# Patient Record
Sex: MALE | Race: ASIAN | NOT HISPANIC OR LATINO | URBAN - METROPOLITAN AREA
[De-identification: names, ages, dates, MRNs, and addresses within clinical notes are randomized per-mention and may not be internally consistent; named-entity substitution may affect disease eponyms.]

---

## 2018-07-02 ENCOUNTER — EMERGENCY (EMERGENCY)
Facility: HOSPITAL | Age: 53
LOS: 0 days | Discharge: HOME | End: 2018-07-02
Attending: STUDENT IN AN ORGANIZED HEALTH CARE EDUCATION/TRAINING PROGRAM
Payer: COMMERCIAL

## 2018-07-02 VITALS
DIASTOLIC BLOOD PRESSURE: 86 MMHG | HEIGHT: 74 IN | OXYGEN SATURATION: 97 % | HEART RATE: 68 BPM | WEIGHT: 199.96 LBS | TEMPERATURE: 99 F | SYSTOLIC BLOOD PRESSURE: 140 MMHG | RESPIRATION RATE: 18 BRPM

## 2018-07-02 DIAGNOSIS — T14.8XXA OTHER INJURY OF UNSPECIFIED BODY REGION, INITIAL ENCOUNTER: ICD-10-CM
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
## 2018-07-02 DIAGNOSIS — Z79.4 LONG TERM (CURRENT) USE OF INSULIN: ICD-10-CM

## 2018-07-02 DIAGNOSIS — M54.9 DORSALGIA, UNSPECIFIED: ICD-10-CM

## 2018-07-02 DIAGNOSIS — Q05.7 LUMBAR SPINA BIFIDA WITHOUT HYDROCEPHALUS: ICD-10-CM

## 2018-07-02 PROCEDURE — 72148 MRI LUMBAR SPINE W/O DYE: CPT | Mod: 26

## 2018-07-02 PROCEDURE — 99284 EMERGENCY DEPT VISIT MOD MDM: CPT

## 2018-07-02 RX ORDER — METHOCARBAMOL 500 MG/1
2 TABLET, FILM COATED ORAL
Qty: 60 | Refills: 0
Start: 2018-07-02 | End: 2018-07-11

## 2018-07-02 RX ORDER — METHOCARBAMOL 500 MG/1
1000 TABLET, FILM COATED ORAL ONCE
Qty: 0 | Refills: 0 | Status: COMPLETED | OUTPATIENT
Start: 2018-07-02 | End: 2018-07-02

## 2018-07-02 RX ORDER — KETOROLAC TROMETHAMINE 30 MG/ML
60 SYRINGE (ML) INJECTION ONCE
Qty: 0 | Refills: 0 | Status: DISCONTINUED | OUTPATIENT
Start: 2018-07-02 | End: 2018-07-02

## 2018-07-02 RX ADMIN — METHOCARBAMOL 1000 MILLIGRAM(S): 500 TABLET, FILM COATED ORAL at 17:05

## 2018-07-02 RX ADMIN — Medication 60 MILLIGRAM(S): at 17:04

## 2018-07-02 NOTE — ED ADULT NURSE NOTE - OBJECTIVE STATEMENT
Patient stated he was coming down the stairs and he tripped, reports pain of 8 on a scale of 0 to 10, pain in lower back, denies syncope, denies head trauma

## 2018-07-02 NOTE — ED PROVIDER NOTE - OBJECTIVE STATEMENT
52 year old male presents today sent in by his PMD for back pain, pt states that he fell down on his back down 6-7 carpeted steps, pt c/o right sided pain rated 8/10 (-) radiation (-) paresthesias (-) urinary or bowel incontinence (-) hematuria,  +tenderness with movement, pt was sent in for MRI

## 2019-12-28 ENCOUNTER — EMERGENCY (EMERGENCY)
Facility: HOSPITAL | Age: 54
LOS: 1 days | Discharge: HOME | End: 2019-12-28
Attending: EMERGENCY MEDICINE | Admitting: EMERGENCY MEDICINE
Payer: COMMERCIAL

## 2019-12-28 VITALS
HEART RATE: 83 BPM | SYSTOLIC BLOOD PRESSURE: 134 MMHG | RESPIRATION RATE: 16 BRPM | TEMPERATURE: 99 F | OXYGEN SATURATION: 100 % | DIASTOLIC BLOOD PRESSURE: 82 MMHG

## 2019-12-28 LAB
ALBUMIN SERPL ELPH-MCNC: 4.5 G/DL — SIGNIFICANT CHANGE UP (ref 3.3–5)
ALP SERPL-CCNC: 49 U/L — SIGNIFICANT CHANGE UP (ref 40–120)
ALT FLD-CCNC: 27 U/L — SIGNIFICANT CHANGE UP (ref 4–41)
ANION GAP SERPL CALC-SCNC: 15 MMO/L — HIGH (ref 7–14)
APPEARANCE UR: CLEAR — SIGNIFICANT CHANGE UP
APTT BLD: 33.5 SEC — SIGNIFICANT CHANGE UP (ref 27.5–36.3)
AST SERPL-CCNC: 32 U/L — SIGNIFICANT CHANGE UP (ref 4–40)
BASOPHILS # BLD AUTO: 0.03 K/UL — SIGNIFICANT CHANGE UP (ref 0–0.2)
BASOPHILS NFR BLD AUTO: 0.5 % — SIGNIFICANT CHANGE UP (ref 0–2)
BILIRUB SERPL-MCNC: 0.5 MG/DL — SIGNIFICANT CHANGE UP (ref 0.2–1.2)
BILIRUB UR-MCNC: NEGATIVE — SIGNIFICANT CHANGE UP
BLD GP AB SCN SERPL QL: NEGATIVE — SIGNIFICANT CHANGE UP
BLOOD UR QL VISUAL: NEGATIVE — SIGNIFICANT CHANGE UP
BUN SERPL-MCNC: 15 MG/DL — SIGNIFICANT CHANGE UP (ref 7–23)
CALCIUM SERPL-MCNC: 9.8 MG/DL — SIGNIFICANT CHANGE UP (ref 8.4–10.5)
CHLORIDE SERPL-SCNC: 96 MMOL/L — LOW (ref 98–107)
CO2 SERPL-SCNC: 24 MMOL/L — SIGNIFICANT CHANGE UP (ref 22–31)
COLOR SPEC: SIGNIFICANT CHANGE UP
CREAT SERPL-MCNC: 0.8 MG/DL — SIGNIFICANT CHANGE UP (ref 0.5–1.3)
EOSINOPHIL # BLD AUTO: 0.21 K/UL — SIGNIFICANT CHANGE UP (ref 0–0.5)
EOSINOPHIL NFR BLD AUTO: 3.8 % — SIGNIFICANT CHANGE UP (ref 0–6)
GLUCOSE SERPL-MCNC: 261 MG/DL — HIGH (ref 70–99)
GLUCOSE UR-MCNC: >1000 — HIGH
HCT VFR BLD CALC: 52.3 % — HIGH (ref 39–50)
HGB BLD-MCNC: 16.9 G/DL — SIGNIFICANT CHANGE UP (ref 13–17)
IMM GRANULOCYTES NFR BLD AUTO: 0.4 % — SIGNIFICANT CHANGE UP (ref 0–1.5)
INR BLD: 1 — SIGNIFICANT CHANGE UP (ref 0.88–1.17)
KETONES UR-MCNC: NEGATIVE — SIGNIFICANT CHANGE UP
LEUKOCYTE ESTERASE UR-ACNC: NEGATIVE — SIGNIFICANT CHANGE UP
LIDOCAIN IGE QN: 244.5 U/L — HIGH (ref 7–60)
LYMPHOCYTES # BLD AUTO: 1.68 K/UL — SIGNIFICANT CHANGE UP (ref 1–3.3)
LYMPHOCYTES # BLD AUTO: 30.4 % — SIGNIFICANT CHANGE UP (ref 13–44)
MCHC RBC-ENTMCNC: 27.3 PG — SIGNIFICANT CHANGE UP (ref 27–34)
MCHC RBC-ENTMCNC: 32.3 % — SIGNIFICANT CHANGE UP (ref 32–36)
MCV RBC AUTO: 84.5 FL — SIGNIFICANT CHANGE UP (ref 80–100)
MONOCYTES # BLD AUTO: 0.58 K/UL — SIGNIFICANT CHANGE UP (ref 0–0.9)
MONOCYTES NFR BLD AUTO: 10.5 % — SIGNIFICANT CHANGE UP (ref 2–14)
NEUTROPHILS # BLD AUTO: 3 K/UL — SIGNIFICANT CHANGE UP (ref 1.8–7.4)
NEUTROPHILS NFR BLD AUTO: 54.4 % — SIGNIFICANT CHANGE UP (ref 43–77)
NITRITE UR-MCNC: NEGATIVE — SIGNIFICANT CHANGE UP
NRBC # FLD: 0 K/UL — SIGNIFICANT CHANGE UP (ref 0–0)
PH UR: 6.5 — SIGNIFICANT CHANGE UP (ref 5–8)
PLATELET # BLD AUTO: 197 K/UL — SIGNIFICANT CHANGE UP (ref 150–400)
PMV BLD: 11.8 FL — SIGNIFICANT CHANGE UP (ref 7–13)
POTASSIUM SERPL-MCNC: 4.7 MMOL/L — SIGNIFICANT CHANGE UP (ref 3.5–5.3)
POTASSIUM SERPL-SCNC: 4.7 MMOL/L — SIGNIFICANT CHANGE UP (ref 3.5–5.3)
PROT SERPL-MCNC: 8.1 G/DL — SIGNIFICANT CHANGE UP (ref 6–8.3)
PROT UR-MCNC: NEGATIVE — SIGNIFICANT CHANGE UP
PROTHROM AB SERPL-ACNC: 11.4 SEC — SIGNIFICANT CHANGE UP (ref 9.8–13.1)
RBC # BLD: 6.19 M/UL — HIGH (ref 4.2–5.8)
RBC # FLD: 13.2 % — SIGNIFICANT CHANGE UP (ref 10.3–14.5)
RH IG SCN BLD-IMP: POSITIVE — SIGNIFICANT CHANGE UP
SODIUM SERPL-SCNC: 135 MMOL/L — SIGNIFICANT CHANGE UP (ref 135–145)
SP GR SPEC: 1.03 — SIGNIFICANT CHANGE UP (ref 1–1.04)
UROBILINOGEN FLD QL: NORMAL — SIGNIFICANT CHANGE UP
WBC # BLD: 5.52 K/UL — SIGNIFICANT CHANGE UP (ref 3.8–10.5)
WBC # FLD AUTO: 5.52 K/UL — SIGNIFICANT CHANGE UP (ref 3.8–10.5)

## 2019-12-28 PROCEDURE — 99285 EMERGENCY DEPT VISIT HI MDM: CPT

## 2019-12-28 PROCEDURE — 74177 CT ABD & PELVIS W/CONTRAST: CPT | Mod: 26

## 2019-12-28 RX ORDER — SODIUM CHLORIDE 9 MG/ML
1000 INJECTION INTRAMUSCULAR; INTRAVENOUS; SUBCUTANEOUS ONCE
Refills: 0 | Status: COMPLETED | OUTPATIENT
Start: 2019-12-28 | End: 2019-12-28

## 2019-12-28 RX ORDER — MORPHINE SULFATE 50 MG/1
4 CAPSULE, EXTENDED RELEASE ORAL ONCE
Refills: 0 | Status: DISCONTINUED | OUTPATIENT
Start: 2019-12-28 | End: 2019-12-28

## 2019-12-28 RX ADMIN — MORPHINE SULFATE 4 MILLIGRAM(S): 50 CAPSULE, EXTENDED RELEASE ORAL at 15:06

## 2019-12-28 RX ADMIN — SODIUM CHLORIDE 1000 MILLILITER(S): 9 INJECTION INTRAMUSCULAR; INTRAVENOUS; SUBCUTANEOUS at 15:06

## 2019-12-28 NOTE — ED PROVIDER NOTE - NSFOLLOWUPINSTRUCTIONS_ED_ALL_ED_FT
Follow up with your primary care physician in the next week  Return to ED for worsening pain, excessive vomiting, or any other complaints in which you feel you require immediate care  Take motrin 600mg every 6 hours for pain

## 2019-12-28 NOTE — ED ADULT NURSE REASSESSMENT NOTE - NS ED NURSE REASSESS COMMENT FT1
Intake pt coming with RLQ pain x few days, denies dysuria, Hematuria, no N/V/D pt tender on palp.    IV to right lat. AC 20g angio cath. medicated for pain.   pending US./UA    Mariia Hood RN

## 2019-12-28 NOTE — ED ADULT NURSE REASSESSMENT NOTE - NS ED NURSE REASSESS COMMENT FT1
Intake pt coming with some SOB, shortness of breath since yest. with some pressure under ribs area, pt recent Dx. with Cervical Ca, right chest area Med port place recently for future Chemo Tx.    Pt AOX 3   denies CP, dizziness, no N/V.    IV to right AC 20g angio cath place, labs sent.   Pending dispo.       Mariia Hood RN

## 2019-12-28 NOTE — ED PROVIDER NOTE - OBJECTIVE STATEMENT
55 yo M with HTN. HLP, DM here with RLQ abdominal pain with radiation to the right flank x 3 days that has been worsening. The pt saw his pmd who sent him to the ED to r/o appendicitis. Pt says he had subjective fever for the past two days. Denies n/v, dysuria, testicular pain, diarrhea, constipation.

## 2019-12-28 NOTE — ED PROVIDER NOTE - PROGRESS NOTE DETAILS
Maurilio: pt in no pain; curious why he had pain; explained I cant give him a definitive answer ?passed stone; assured him CT shows no pathology

## 2019-12-28 NOTE — ED PROVIDER NOTE - CLINICAL SUMMARY MEDICAL DECISION MAKING FREE TEXT BOX
r/o appendicitis vs. ureteral stone, however, history and physical most consistent with appendicitis; will check labs, ua, uc/s, CT abdomen/pelvis with IV contrast, pain control, fluids, reassess

## 2019-12-30 LAB
BACTERIA UR CULT: SIGNIFICANT CHANGE UP
SPECIMEN SOURCE: SIGNIFICANT CHANGE UP

## 2021-11-26 ENCOUNTER — INPATIENT (INPATIENT)
Facility: HOSPITAL | Age: 56
LOS: 0 days | Discharge: HOME | End: 2021-11-27
Attending: INTERNAL MEDICINE | Admitting: INTERNAL MEDICINE
Payer: COMMERCIAL

## 2021-11-26 VITALS
RESPIRATION RATE: 18 BRPM | HEART RATE: 83 BPM | HEIGHT: 74 IN | DIASTOLIC BLOOD PRESSURE: 101 MMHG | OXYGEN SATURATION: 99 % | SYSTOLIC BLOOD PRESSURE: 179 MMHG | TEMPERATURE: 98 F

## 2021-11-26 DIAGNOSIS — I21.4 NON-ST ELEVATION (NSTEMI) MYOCARDIAL INFARCTION: ICD-10-CM

## 2021-11-26 LAB
ALBUMIN SERPL ELPH-MCNC: 4.6 G/DL — SIGNIFICANT CHANGE UP (ref 3.3–5)
ALP SERPL-CCNC: 48 U/L — SIGNIFICANT CHANGE UP (ref 40–120)
ALT FLD-CCNC: 25 U/L — SIGNIFICANT CHANGE UP (ref 4–41)
ANION GAP SERPL CALC-SCNC: 14 MMOL/L — SIGNIFICANT CHANGE UP (ref 7–14)
APTT BLD: 34.6 SEC — SIGNIFICANT CHANGE UP (ref 27–36.3)
AST SERPL-CCNC: 30 U/L — SIGNIFICANT CHANGE UP (ref 4–40)
BASOPHILS # BLD AUTO: 0.03 K/UL — SIGNIFICANT CHANGE UP (ref 0–0.2)
BASOPHILS NFR BLD AUTO: 0.5 % — SIGNIFICANT CHANGE UP (ref 0–2)
BILIRUB SERPL-MCNC: 0.4 MG/DL — SIGNIFICANT CHANGE UP (ref 0.2–1.2)
BUN SERPL-MCNC: 15 MG/DL — SIGNIFICANT CHANGE UP (ref 7–23)
CALCIUM SERPL-MCNC: 9.2 MG/DL — SIGNIFICANT CHANGE UP (ref 8.4–10.5)
CHLORIDE SERPL-SCNC: 101 MMOL/L — SIGNIFICANT CHANGE UP (ref 98–107)
CK MB CFR SERPL CALC: 19.8 NG/ML — HIGH
CO2 SERPL-SCNC: 22 MMOL/L — SIGNIFICANT CHANGE UP (ref 22–31)
CREAT SERPL-MCNC: 0.78 MG/DL — SIGNIFICANT CHANGE UP (ref 0.5–1.3)
EOSINOPHIL # BLD AUTO: 0.14 K/UL — SIGNIFICANT CHANGE UP (ref 0–0.5)
EOSINOPHIL NFR BLD AUTO: 2.1 % — SIGNIFICANT CHANGE UP (ref 0–6)
GLUCOSE SERPL-MCNC: 203 MG/DL — HIGH (ref 70–99)
HCT VFR BLD CALC: 46.9 % — SIGNIFICANT CHANGE UP (ref 39–50)
HGB BLD-MCNC: 15.5 G/DL — SIGNIFICANT CHANGE UP (ref 13–17)
IANC: 4.58 K/UL — SIGNIFICANT CHANGE UP (ref 1.5–8.5)
IMM GRANULOCYTES NFR BLD AUTO: 0.6 % — SIGNIFICANT CHANGE UP (ref 0–1.5)
INR BLD: 1.02 RATIO — SIGNIFICANT CHANGE UP (ref 0.88–1.16)
LYMPHOCYTES # BLD AUTO: 1.34 K/UL — SIGNIFICANT CHANGE UP (ref 1–3.3)
LYMPHOCYTES # BLD AUTO: 20.3 % — SIGNIFICANT CHANGE UP (ref 13–44)
MCHC RBC-ENTMCNC: 27.1 PG — SIGNIFICANT CHANGE UP (ref 27–34)
MCHC RBC-ENTMCNC: 33 GM/DL — SIGNIFICANT CHANGE UP (ref 32–36)
MCV RBC AUTO: 82.1 FL — SIGNIFICANT CHANGE UP (ref 80–100)
MONOCYTES # BLD AUTO: 0.46 K/UL — SIGNIFICANT CHANGE UP (ref 0–0.9)
MONOCYTES NFR BLD AUTO: 7 % — SIGNIFICANT CHANGE UP (ref 2–14)
NEUTROPHILS # BLD AUTO: 4.58 K/UL — SIGNIFICANT CHANGE UP (ref 1.8–7.4)
NEUTROPHILS NFR BLD AUTO: 69.5 % — SIGNIFICANT CHANGE UP (ref 43–77)
NRBC # BLD: 0 /100 WBCS — SIGNIFICANT CHANGE UP
NRBC # FLD: 0 K/UL — SIGNIFICANT CHANGE UP
NT-PROBNP SERPL-SCNC: 166 PG/ML — SIGNIFICANT CHANGE UP
PLATELET # BLD AUTO: 196 K/UL — SIGNIFICANT CHANGE UP (ref 150–400)
POTASSIUM SERPL-MCNC: 4 MMOL/L — SIGNIFICANT CHANGE UP (ref 3.5–5.3)
POTASSIUM SERPL-SCNC: 4 MMOL/L — SIGNIFICANT CHANGE UP (ref 3.5–5.3)
PROT SERPL-MCNC: 7.5 G/DL — SIGNIFICANT CHANGE UP (ref 6–8.3)
PROTHROM AB SERPL-ACNC: 11.6 SEC — SIGNIFICANT CHANGE UP (ref 10.6–13.6)
RBC # BLD: 5.71 M/UL — SIGNIFICANT CHANGE UP (ref 4.2–5.8)
RBC # FLD: 13 % — SIGNIFICANT CHANGE UP (ref 10.3–14.5)
SARS-COV-2 RNA SPEC QL NAA+PROBE: SIGNIFICANT CHANGE UP
SODIUM SERPL-SCNC: 137 MMOL/L — SIGNIFICANT CHANGE UP (ref 135–145)
TROPONIN T, HIGH SENSITIVITY RESULT: 137 NG/L — CRITICAL HIGH
TROPONIN T, HIGH SENSITIVITY RESULT: 205 NG/L — CRITICAL HIGH
WBC # BLD: 6.59 K/UL — SIGNIFICANT CHANGE UP (ref 3.8–10.5)
WBC # FLD AUTO: 6.59 K/UL — SIGNIFICANT CHANGE UP (ref 3.8–10.5)

## 2021-11-26 PROCEDURE — 99285 EMERGENCY DEPT VISIT HI MDM: CPT | Mod: 25

## 2021-11-26 PROCEDURE — 93010 ELECTROCARDIOGRAM REPORT: CPT

## 2021-11-26 PROCEDURE — 93010 ELECTROCARDIOGRAM REPORT: CPT | Mod: NC

## 2021-11-26 PROCEDURE — 71045 X-RAY EXAM CHEST 1 VIEW: CPT | Mod: 26

## 2021-11-26 PROCEDURE — 92928 PRQ TCAT PLMT NTRAC ST 1 LES: CPT | Mod: LD

## 2021-11-26 PROCEDURE — 93458 L HRT ARTERY/VENTRICLE ANGIO: CPT | Mod: 26,59

## 2021-11-26 RX ORDER — ATORVASTATIN CALCIUM 80 MG/1
40 TABLET, FILM COATED ORAL AT BEDTIME
Refills: 0 | Status: DISCONTINUED | OUTPATIENT
Start: 2021-11-26 | End: 2021-11-27

## 2021-11-26 RX ORDER — METOPROLOL TARTRATE 50 MG
12.5 TABLET ORAL
Refills: 0 | Status: DISCONTINUED | OUTPATIENT
Start: 2021-11-26 | End: 2021-11-27

## 2021-11-26 RX ORDER — HEPARIN SODIUM 5000 [USP'U]/ML
INJECTION INTRAVENOUS; SUBCUTANEOUS
Qty: 25000 | Refills: 0 | Status: DISCONTINUED | OUTPATIENT
Start: 2021-11-26 | End: 2021-11-26

## 2021-11-26 RX ORDER — DEXTROSE 50 % IN WATER 50 %
15 SYRINGE (ML) INTRAVENOUS ONCE
Refills: 0 | Status: DISCONTINUED | OUTPATIENT
Start: 2021-11-26 | End: 2021-11-27

## 2021-11-26 RX ORDER — NITROGLYCERIN 6.5 MG
0.4 CAPSULE, EXTENDED RELEASE ORAL ONCE
Refills: 0 | Status: COMPLETED | OUTPATIENT
Start: 2021-11-26 | End: 2021-11-26

## 2021-11-26 RX ORDER — ASPIRIN/CALCIUM CARB/MAGNESIUM 324 MG
81 TABLET ORAL DAILY
Refills: 0 | Status: DISCONTINUED | OUTPATIENT
Start: 2021-11-27 | End: 2021-11-27

## 2021-11-26 RX ORDER — DEXTROSE 50 % IN WATER 50 %
25 SYRINGE (ML) INTRAVENOUS ONCE
Refills: 0 | Status: DISCONTINUED | OUTPATIENT
Start: 2021-11-26 | End: 2021-11-27

## 2021-11-26 RX ORDER — HEPARIN SODIUM 5000 [USP'U]/ML
4100 INJECTION INTRAVENOUS; SUBCUTANEOUS ONCE
Refills: 0 | Status: DISCONTINUED | OUTPATIENT
Start: 2021-11-26 | End: 2021-11-26

## 2021-11-26 RX ORDER — MORPHINE SULFATE 50 MG/1
2 CAPSULE, EXTENDED RELEASE ORAL ONCE
Refills: 0 | Status: DISCONTINUED | OUTPATIENT
Start: 2021-11-26 | End: 2021-11-26

## 2021-11-26 RX ORDER — DEXTROSE 50 % IN WATER 50 %
12.5 SYRINGE (ML) INTRAVENOUS ONCE
Refills: 0 | Status: DISCONTINUED | OUTPATIENT
Start: 2021-11-26 | End: 2021-11-27

## 2021-11-26 RX ORDER — HEPARIN SODIUM 5000 [USP'U]/ML
5000 INJECTION INTRAVENOUS; SUBCUTANEOUS ONCE
Refills: 0 | Status: COMPLETED | OUTPATIENT
Start: 2021-11-26 | End: 2021-11-26

## 2021-11-26 RX ORDER — HEPARIN SODIUM 5000 [USP'U]/ML
5000 INJECTION INTRAVENOUS; SUBCUTANEOUS EVERY 12 HOURS
Refills: 0 | Status: DISCONTINUED | OUTPATIENT
Start: 2021-11-26 | End: 2021-11-27

## 2021-11-26 RX ORDER — SODIUM CHLORIDE 9 MG/ML
1000 INJECTION, SOLUTION INTRAVENOUS
Refills: 0 | Status: DISCONTINUED | OUTPATIENT
Start: 2021-11-26 | End: 2021-11-27

## 2021-11-26 RX ORDER — TICAGRELOR 90 MG/1
1 TABLET ORAL
Qty: 60 | Refills: 0
Start: 2021-11-26 | End: 2021-12-25

## 2021-11-26 RX ORDER — INSULIN LISPRO 100/ML
VIAL (ML) SUBCUTANEOUS
Refills: 0 | Status: DISCONTINUED | OUTPATIENT
Start: 2021-11-26 | End: 2021-11-27

## 2021-11-26 RX ORDER — HEPARIN SODIUM 5000 [USP'U]/ML
4100 INJECTION INTRAVENOUS; SUBCUTANEOUS EVERY 6 HOURS
Refills: 0 | Status: DISCONTINUED | OUTPATIENT
Start: 2021-11-26 | End: 2021-11-26

## 2021-11-26 RX ORDER — HEPARIN SODIUM 5000 [USP'U]/ML
5300 INJECTION INTRAVENOUS; SUBCUTANEOUS EVERY 6 HOURS
Refills: 0 | Status: DISCONTINUED | OUTPATIENT
Start: 2021-11-26 | End: 2021-11-26

## 2021-11-26 RX ORDER — TICAGRELOR 90 MG/1
180 TABLET ORAL ONCE
Refills: 0 | Status: COMPLETED | OUTPATIENT
Start: 2021-11-26 | End: 2021-11-26

## 2021-11-26 RX ORDER — TICAGRELOR 90 MG/1
90 TABLET ORAL EVERY 12 HOURS
Refills: 0 | Status: DISCONTINUED | OUTPATIENT
Start: 2021-11-26 | End: 2021-11-27

## 2021-11-26 RX ORDER — ACETAMINOPHEN 500 MG
650 TABLET ORAL EVERY 6 HOURS
Refills: 0 | Status: DISCONTINUED | OUTPATIENT
Start: 2021-11-26 | End: 2021-11-27

## 2021-11-26 RX ORDER — GLUCAGON INJECTION, SOLUTION 0.5 MG/.1ML
1 INJECTION, SOLUTION SUBCUTANEOUS ONCE
Refills: 0 | Status: DISCONTINUED | OUTPATIENT
Start: 2021-11-26 | End: 2021-11-27

## 2021-11-26 RX ORDER — MORPHINE SULFATE 50 MG/1
2 CAPSULE, EXTENDED RELEASE ORAL ONCE
Refills: 0 | Status: DISCONTINUED | OUTPATIENT
Start: 2021-11-26 | End: 2021-11-27

## 2021-11-26 RX ORDER — ASPIRIN/CALCIUM CARB/MAGNESIUM 324 MG
162 TABLET ORAL ONCE
Refills: 0 | Status: DISCONTINUED | OUTPATIENT
Start: 2021-11-26 | End: 2021-11-26

## 2021-11-26 RX ORDER — ASPIRIN/CALCIUM CARB/MAGNESIUM 324 MG
162 TABLET ORAL ONCE
Refills: 0 | Status: COMPLETED | OUTPATIENT
Start: 2021-11-26 | End: 2021-11-26

## 2021-11-26 RX ORDER — INFLUENZA VIRUS VACCINE 15; 15; 15; 15 UG/.5ML; UG/.5ML; UG/.5ML; UG/.5ML
0.5 SUSPENSION INTRAMUSCULAR ONCE
Refills: 0 | Status: DISCONTINUED | OUTPATIENT
Start: 2021-11-26 | End: 2021-11-27

## 2021-11-26 RX ADMIN — MORPHINE SULFATE 2 MILLIGRAM(S): 50 CAPSULE, EXTENDED RELEASE ORAL at 18:33

## 2021-11-26 RX ADMIN — HEPARIN SODIUM 1000 UNIT(S)/HR: 5000 INJECTION INTRAVENOUS; SUBCUTANEOUS at 09:28

## 2021-11-26 RX ADMIN — TICAGRELOR 180 MILLIGRAM(S): 90 TABLET ORAL at 09:27

## 2021-11-26 RX ADMIN — TICAGRELOR 90 MILLIGRAM(S): 90 TABLET ORAL at 20:41

## 2021-11-26 RX ADMIN — HEPARIN SODIUM 5000 UNIT(S): 5000 INJECTION INTRAVENOUS; SUBCUTANEOUS at 09:28

## 2021-11-26 RX ADMIN — ATORVASTATIN CALCIUM 40 MILLIGRAM(S): 80 TABLET, FILM COATED ORAL at 20:42

## 2021-11-26 RX ADMIN — MORPHINE SULFATE 2 MILLIGRAM(S): 50 CAPSULE, EXTENDED RELEASE ORAL at 18:48

## 2021-11-26 RX ADMIN — Medication 12.5 MILLIGRAM(S): at 20:41

## 2021-11-26 RX ADMIN — Medication 0.4 MILLIGRAM(S): at 18:08

## 2021-11-26 RX ADMIN — Medication 162 MILLIGRAM(S): at 06:58

## 2021-11-26 RX ADMIN — Medication 0.4 MILLIGRAM(S): at 06:58

## 2021-11-26 RX ADMIN — Medication 0.4 MILLIGRAM(S): at 18:15

## 2021-11-26 RX ADMIN — Medication 2: at 12:52

## 2021-11-26 RX ADMIN — Medication 30 MILLILITER(S): at 18:20

## 2021-11-26 NOTE — ED PROVIDER NOTE - PHYSICAL EXAMINATION
General: NAD  HEENT: NCAT, PERRL  Cardiac: RRR, no murmurs, 2+ radial pulses, equal  Chest: CTA +ttp of L chest, no skin changes at site  Abdomen: soft, non-distended, bowel sounds present, no ttp, no rebound or guarding  Extremities: no peripheral edema, calf tenderness, or leg size discrepancies  Skin: no rashes  Neuro: AAOx3, motor and sensory grossly intact  Psych: mood and affect appropriate

## 2021-11-26 NOTE — PROGRESS NOTE ADULT - SUBJECTIVE AND OBJECTIVE BOX
CCU Accept Note    SRAVAN RATLIFF  56y  Patient is a 56y old  Male who presents with a chief complaint of chest pain    ====================  HPI & Hospital Course:   56 year-old M with MHx of HTN and NIDDM, who presented to [facility] on 11/26/2021 with chest pain. Mr. Ratliff states that the pain occurred at 1 AM this morning and woke him up from sleep. The pain is on the L side of his chest and is constantly bothering him. Pain is aggravated by movement of the upper extremities. Tried aspirin and ibuprofen, but got no relief. ROS otherwise negative. He decided to come the ED. He denies any cardiac history and does not smoke. 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 Currently, he says the pain has improved.     In the ED, his vitals were significant for hypertension with systolic BP b/t 133-179. Troponins elevated at 150. CKMB 20. Diagnosed with NSTEMI. He was loaded with aspirin, Brilinta, and heparin. Also received 1x nitroglycerin. Went for cardiac catheterization and found to have 100% occlusion of diagonal. Admitted to CCU for further management.      Past Medical History:   DM  HTN    Past Surgical History:   none    Home Medications:  metFORMIN 500 mg oral tablet: 1 tab(s) orally 2 times a day (02 Jul 2018 15:07)      Current Medications:   MEDICATIONS  (STANDING):  atorvastatin 40 milliGRAM(s) Oral at bedtime  dextrose 40% Gel 15 Gram(s) Oral once  dextrose 5%. 1000 milliLiter(s) (50 mL/Hr) IV Continuous <Continuous>  dextrose 5%. 1000 milliLiter(s) (100 mL/Hr) IV Continuous <Continuous>  dextrose 50% Injectable 25 Gram(s) IV Push once  dextrose 50% Injectable 12.5 Gram(s) IV Push once  dextrose 50% Injectable 25 Gram(s) IV Push once  glucagon  Injectable 1 milliGRAM(s) IntraMuscular once  insulin lispro (ADMELOG) corrective regimen sliding scale   SubCutaneous three times a day before meals  ticagrelor 90 milliGRAM(s) Oral every 12 hours    MEDICATIONS  (PRN):      Allergies: none    Family History: HTN     Social History: non-smoker,     ====================  Vital Signs Last 24 Hrs  T(C): 36.8 (26 Nov 2021 12:12), Max: 37.1 (26 Nov 2021 08:18)  T(F): 98.2 (26 Nov 2021 12:12), Max: 98.7 (26 Nov 2021 08:18)  HR: 70 (26 Nov 2021 12:12) (70 - 83)  BP: 149/98 (26 Nov 2021 12:12) (132/82 - 179/101)  BP(mean): --  RR: 22 (26 Nov 2021 12:12) (18 - 22)  SpO2: 100% (26 Nov 2021 12:12) (98% - 100%)    Adult Advanced Hemodynamics Last 24 Hrs  CVP(mm Hg): --  CVP(cm H2O): --  CO: --  CI: --  PA: --  PA(mean): --  PCWP: --  SVR: --  SVRI: --  PVR: --  PVRI: --    Physical Exam:   General: NAD  HEENT: PERRL, EOMI, normal sclera and conjunctiva, no oral lesions  Neck: Supple, no JVD  Lungs: CTA bilaterally  Heart: RRR, normal S1S2, no murmurs/rubs/gallops  Abdomen: Soft, ND/NT  Extremities: 2+ peripheral pulses, no cyanosis/clubbing/edema, full ROM  Skin: Warm, well-perfused  Neuro: A&O x3, no focal deficits      ====================  Labs & Imaging:   CBC Full  -  ( 26 Nov 2021 07:16 )  WBC Count : 6.59 K/uL  RBC Count : 5.71 M/uL  Hemoglobin : 15.5 g/dL  Hematocrit : 46.9 %  Platelet Count - Automated : 196 K/uL  Mean Cell Volume : 82.1 fL  Mean Cell Hemoglobin : 27.1 pg  Mean Cell Hemoglobin Concentration : 33.0 gm/dL  Auto Neutrophil # : 4.58 K/uL  Auto Lymphocyte # : 1.34 K/uL  Auto Monocyte # : 0.46 K/uL  Auto Eosinophil # : 0.14 K/uL  Auto Basophil # : 0.03 K/uL  Auto Neutrophil % : 69.5 %  Auto Lymphocyte % : 20.3 %  Auto Monocyte % : 7.0 %  Auto Eosinophil % : 2.1 %  Auto Basophil % : 0.5 %    11-26    137  |  101  |  15  ----------------------------<  203<H>  4.0   |  22  |  0.78    Ca    9.2      26 Nov 2021 07:16    TPro  7.5  /  Alb  4.6  /  TBili  0.4  /  DBili  x   /  AST  30  /  ALT  25  /  AlkPhos  48  11-26    PT/INR - ( 26 Nov 2021 09:36 )   PT: 11.6 sec;   INR: 1.02 ratio         PTT - ( 26 Nov 2021 09:36 )  PTT:34.6 sec    CARDIAC MARKERS ( 26 Nov 2021 07:16 )  x     / x     / x     / x     / 19.8 ng/mL              ====================  Assessment & Plan:     56 year old man with HTN and NIDDM presents with chest pain and NSTEMI. Found to have 100% occlusion of diagonal. Admitted to CCU for further management.    #Neuro  -awake and alert  -no acute issues    #CV  #NSTEMI-  - chest pain started 1am on 11/26 and woke patient from sleep, now subsided  - Currently no ST segment changes noted on serial EKGs  - troponin 150  - CKMB 20  - s/p ASA, Brilinta load, and heparin gtt  - cardiac cath found 100% occlusion of diagonal  - c/w Brilinta 90mg BID  - c/w aspirin 81mg daily  - c/w Lipitor 80mg daily  - continue with cardiac monitoring  - trend troponin  #HTN-  Patient will call for his home list    #Pulm  -on RA, O2 sat adequate  -CXR with clear lungs on 11/26    #Endo  #NIDDM-  -holding home Metformin 1000 mg BID and glipizide  -SSI  -fu A1c and lipid panel    #GI  -no acute issues  -DASH diet    #ID  -no acute issues  -afebrile, WBC wnl    #heme  [ ] for DVT ppx    ====================     CCU Accept Note    SRAVAN RATLIFF  56y  Patient is a 56y old  Male who presents with a chief complaint of chest pain    ====================  HPI & Hospital Course:   56 year-old M with MHx of HTN and NIDDM, who presented to [facility] on 11/26/2021 with chest pain. Mr. Ratliff states that the pain occurred at 1 AM this morning and woke him up from sleep. The pain is on the L side of his chest and is constantly bothering him. Pain is aggravated by movement of the upper extremities. Tried aspirin and ibuprofen, but got no relief. ROS otherwise negative. He decided to come the ED. He denies any cardiac history and does not smoke. 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 Currently, he says the pain has improved.     In the ED, his vitals were significant for hypertension with systolic BP b/t 133-179. Troponins elevated at 150. CKMB 20. Diagnosed with NSTEMI. He was loaded with aspirin, Brilinta, and heparin. Also received 1x nitroglycerin. Went for cardiac catheterization and found to have 100% occlusion of diagonal. Admitted to CCU for further management.      Past Medical History:   DM  HTN    Past Surgical History:   none    Home Medications:  metFORMIN 500 mg oral tablet: 1 tab(s) orally 2 times a day (02 Jul 2018 15:07)      Current Medications:   MEDICATIONS  (STANDING):  atorvastatin 40 milliGRAM(s) Oral at bedtime  dextrose 40% Gel 15 Gram(s) Oral once  dextrose 5%. 1000 milliLiter(s) (50 mL/Hr) IV Continuous <Continuous>  dextrose 5%. 1000 milliLiter(s) (100 mL/Hr) IV Continuous <Continuous>  dextrose 50% Injectable 25 Gram(s) IV Push once  dextrose 50% Injectable 12.5 Gram(s) IV Push once  dextrose 50% Injectable 25 Gram(s) IV Push once  glucagon  Injectable 1 milliGRAM(s) IntraMuscular once  insulin lispro (ADMELOG) corrective regimen sliding scale   SubCutaneous three times a day before meals  ticagrelor 90 milliGRAM(s) Oral every 12 hours    MEDICATIONS  (PRN):      Allergies: none    Family History: HTN     Social History: non-smoker,     ====================  Vital Signs Last 24 Hrs  T(C): 36.8 (26 Nov 2021 12:12), Max: 37.1 (26 Nov 2021 08:18)  T(F): 98.2 (26 Nov 2021 12:12), Max: 98.7 (26 Nov 2021 08:18)  HR: 70 (26 Nov 2021 12:12) (70 - 83)  BP: 149/98 (26 Nov 2021 12:12) (132/82 - 179/101)  BP(mean): --  RR: 22 (26 Nov 2021 12:12) (18 - 22)  SpO2: 100% (26 Nov 2021 12:12) (98% - 100%)    Adult Advanced Hemodynamics Last 24 Hrs  CVP(mm Hg): --  CVP(cm H2O): --  CO: --  CI: --  PA: --  PA(mean): --  PCWP: --  SVR: --  SVRI: --  PVR: --  PVRI: --    Physical Exam:   General: NAD  HEENT: PERRL, EOMI, normal sclera and conjunctiva, no oral lesions  Neck: Supple, no JVD  Lungs: CTA bilaterally  Heart: RRR, normal S1S2, no murmurs/rubs/gallops  Abdomen: Soft, ND/NT  Extremities: 2+ peripheral pulses, no cyanosis/clubbing/edema, full ROM  Skin: Warm, well-perfused  Neuro: A&O x3, no focal deficits      ====================  Labs & Imaging:   CBC Full  -  ( 26 Nov 2021 07:16 )  WBC Count : 6.59 K/uL  RBC Count : 5.71 M/uL  Hemoglobin : 15.5 g/dL  Hematocrit : 46.9 %  Platelet Count - Automated : 196 K/uL  Mean Cell Volume : 82.1 fL  Mean Cell Hemoglobin : 27.1 pg  Mean Cell Hemoglobin Concentration : 33.0 gm/dL  Auto Neutrophil # : 4.58 K/uL  Auto Lymphocyte # : 1.34 K/uL  Auto Monocyte # : 0.46 K/uL  Auto Eosinophil # : 0.14 K/uL  Auto Basophil # : 0.03 K/uL  Auto Neutrophil % : 69.5 %  Auto Lymphocyte % : 20.3 %  Auto Monocyte % : 7.0 %  Auto Eosinophil % : 2.1 %  Auto Basophil % : 0.5 %    11-26    137  |  101  |  15  ----------------------------<  203<H>  4.0   |  22  |  0.78    Ca    9.2      26 Nov 2021 07:16    TPro  7.5  /  Alb  4.6  /  TBili  0.4  /  DBili  x   /  AST  30  /  ALT  25  /  AlkPhos  48  11-26    PT/INR - ( 26 Nov 2021 09:36 )   PT: 11.6 sec;   INR: 1.02 ratio         PTT - ( 26 Nov 2021 09:36 )  PTT:34.6 sec    CARDIAC MARKERS ( 26 Nov 2021 07:16 )  x     / x     / x     / x     / 19.8 ng/mL              ====================  Assessment & Plan:     56 year old man with HTN and NIDDM presents with chest pain and NSTEMI. Found to have 100% occlusion of diagonal. Admitted to CCU for further management.    #Neuro  -awake and alert  -no acute issues    #CV  #NSTEMI-  - chest pain started 1am on 11/26 and woke patient from sleep, now subsided  - Currently no ST segment changes noted on serial EKGs  - troponin 150  - CKMB 20  - s/p ASA, Brilinta load, and heparin gtt  - cardiac cath found 100% occlusion of diagonal  - c/w Brilinta 90mg BID  - c/w aspirin 81mg daily  - c/w Lipitor 80mg daily  - continue with cardiac monitoring  - trend troponin  - fu TTE  #HTN-  Patient will call for his home list    #Pulm  -on RA, O2 sat adequate  -CXR with clear lungs on 11/26    #Endo  #NIDDM-  -holding home Metformin 1000 mg BID and glipizide  -SSI  -fu A1c and lipid panel    #GI  -no acute issues  -DASH diet    #ID  -no acute issues  -afebrile, WBC wnl    #heme  [ ] for DVT ppx    ====================     CCU Accept Note    SRAVAN RATLIFF  56y  Patient is a 56y old  Male who presents with a chief complaint of chest pain    ====================  HPI & Hospital Course:   56 year-old M with MHx of HTN and NIDDM, who presented to [facility] on 11/26/2021 with chest pain. Mr. Ratliff states that the pain occurred at 1 AM this morning and woke him up from sleep. The pain is on the L side of his chest and is constantly bothering him. Pain is aggravated by movement of the upper extremities. Tried aspirin and ibuprofen, but got no relief. ROS otherwise negative. He decided to come the ED. He denies any cardiac history and does not smoke. 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 Currently, he says the pain has improved.     In the ED, his vitals were significant for hypertension with systolic BP b/t 133-179. Troponins elevated at 150. CKMB 20. Diagnosed with NSTEMI. He was loaded with aspirin, Brilinta, and heparin. Also received 1x nitroglycerin. Went for cardiac catheterization and found to have 100% occlusion of diagonal. S/p stent placement. Admitted to CCU for further management.      Past Medical History:   DM  HTN    Past Surgical History:   none    Home Medications:  metFORMIN 500 mg oral tablet: 1 tab(s) orally 2 times a day (02 Jul 2018 15:07)      Current Medications:   MEDICATIONS  (STANDING):  atorvastatin 40 milliGRAM(s) Oral at bedtime  dextrose 40% Gel 15 Gram(s) Oral once  dextrose 5%. 1000 milliLiter(s) (50 mL/Hr) IV Continuous <Continuous>  dextrose 5%. 1000 milliLiter(s) (100 mL/Hr) IV Continuous <Continuous>  dextrose 50% Injectable 25 Gram(s) IV Push once  dextrose 50% Injectable 12.5 Gram(s) IV Push once  dextrose 50% Injectable 25 Gram(s) IV Push once  glucagon  Injectable 1 milliGRAM(s) IntraMuscular once  insulin lispro (ADMELOG) corrective regimen sliding scale   SubCutaneous three times a day before meals  ticagrelor 90 milliGRAM(s) Oral every 12 hours    MEDICATIONS  (PRN):      Allergies: none    Family History: HTN     Social History: non-smoker,     ====================  Vital Signs Last 24 Hrs  T(C): 36.8 (26 Nov 2021 12:12), Max: 37.1 (26 Nov 2021 08:18)  T(F): 98.2 (26 Nov 2021 12:12), Max: 98.7 (26 Nov 2021 08:18)  HR: 70 (26 Nov 2021 12:12) (70 - 83)  BP: 149/98 (26 Nov 2021 12:12) (132/82 - 179/101)  BP(mean): --  RR: 22 (26 Nov 2021 12:12) (18 - 22)  SpO2: 100% (26 Nov 2021 12:12) (98% - 100%)    Adult Advanced Hemodynamics Last 24 Hrs  CVP(mm Hg): --  CVP(cm H2O): --  CO: --  CI: --  PA: --  PA(mean): --  PCWP: --  SVR: --  SVRI: --  PVR: --  PVRI: --    Physical Exam:   General: NAD  HEENT: PERRL, EOMI, normal sclera and conjunctiva, no oral lesions  Neck: Supple, no JVD  Lungs: CTA bilaterally  Heart: RRR, normal S1S2, no murmurs/rubs/gallops  Abdomen: Soft, ND/NT  Extremities: 2+ peripheral pulses, no cyanosis/clubbing/edema, full ROM  Skin: Warm, well-perfused  Neuro: A&O x3, no focal deficits      ====================  Labs & Imaging:   CBC Full  -  ( 26 Nov 2021 07:16 )  WBC Count : 6.59 K/uL  RBC Count : 5.71 M/uL  Hemoglobin : 15.5 g/dL  Hematocrit : 46.9 %  Platelet Count - Automated : 196 K/uL  Mean Cell Volume : 82.1 fL  Mean Cell Hemoglobin : 27.1 pg  Mean Cell Hemoglobin Concentration : 33.0 gm/dL  Auto Neutrophil # : 4.58 K/uL  Auto Lymphocyte # : 1.34 K/uL  Auto Monocyte # : 0.46 K/uL  Auto Eosinophil # : 0.14 K/uL  Auto Basophil # : 0.03 K/uL  Auto Neutrophil % : 69.5 %  Auto Lymphocyte % : 20.3 %  Auto Monocyte % : 7.0 %  Auto Eosinophil % : 2.1 %  Auto Basophil % : 0.5 %    11-26    137  |  101  |  15  ----------------------------<  203<H>  4.0   |  22  |  0.78    Ca    9.2      26 Nov 2021 07:16    TPro  7.5  /  Alb  4.6  /  TBili  0.4  /  DBili  x   /  AST  30  /  ALT  25  /  AlkPhos  48  11-26    PT/INR - ( 26 Nov 2021 09:36 )   PT: 11.6 sec;   INR: 1.02 ratio         PTT - ( 26 Nov 2021 09:36 )  PTT:34.6 sec    CARDIAC MARKERS ( 26 Nov 2021 07:16 )  x     / x     / x     / x     / 19.8 ng/mL              ====================  Assessment & Plan:     56 year old man with HTN and NIDDM presents with chest pain and NSTEMI. Found to have 100% occlusion of diagonal. Admitted to CCU for further management.    #Neuro  -awake and alert  -no acute issues    #CV  #NSTEMI-  - chest pain started 1am on 11/26 and woke patient from sleep, now subsided  - Currently no ST segment changes noted on serial EKGs  - troponin 150  - CKMB 20  - s/p ASA, Brilinta load, and heparin gtt  - cardiac cath found 100% occlusion of diagonal --> s/p stent  - c/w Brilinta 90mg BID  - c/w aspirin 81mg daily  - c/w Lipitor 80mg daily  - continue with cardiac monitoring  - fu TTE  #HTN-  Patient will call for his home list    #Pulm  -on RA, O2 sat adequate  -CXR with clear lungs on 11/26    #Endo  #NIDDM-  -holding home Metformin 1000 mg BID and glipizide  -SSI  -fu A1c and lipid panel    #GI  -no acute issues  -DASH diet    #ID  -no acute issues  -afebrile, WBC wnl    #heme  [ ] for DVT ppx    ====================     CCU Accept Note    SRAVAN RATLIFF  56y  Patient is a 56y old  Male who presents with a chief complaint of chest pain    ====================  HPI & Hospital Course:   56 year-old M with MHx of HTN and NIDDM, who presented to [facility] on 11/26/2021 with chest pain. Mr. Ratliff states that the pain occurred at 1 AM this morning and woke him up from sleep. The pain is on the L side of his chest and is constantly bothering him. Pain is aggravated by movement of the upper extremities. Tried aspirin and ibuprofen, but got no relief. ROS otherwise negative. He decided to come the ED. He denies any cardiac history and does not smoke. 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 Currently, he says the pain has improved.     In the ED, his vitals were significant for hypertension with systolic BP b/t 133-179. Troponins elevated at 150. CKMB 20. Diagnosed with NSTEMI. He was loaded with aspirin, Brilinta, and heparin. Also received 1x nitroglycerin. Went for cardiac catheterization and found to have 100% occlusion of diagonal. S/p stent placement. Admitted to CCU for further management.      Past Medical History:   DM  HTN    Past Surgical History:   none    Home Medications:  metFORMIN 500 mg oral tablet: 1 tab(s) orally 2 times a day (02 Jul 2018 15:07)      Current Medications:   MEDICATIONS  (STANDING):  atorvastatin 40 milliGRAM(s) Oral at bedtime  dextrose 40% Gel 15 Gram(s) Oral once  dextrose 5%. 1000 milliLiter(s) (50 mL/Hr) IV Continuous <Continuous>  dextrose 5%. 1000 milliLiter(s) (100 mL/Hr) IV Continuous <Continuous>  dextrose 50% Injectable 25 Gram(s) IV Push once  dextrose 50% Injectable 12.5 Gram(s) IV Push once  dextrose 50% Injectable 25 Gram(s) IV Push once  glucagon  Injectable 1 milliGRAM(s) IntraMuscular once  insulin lispro (ADMELOG) corrective regimen sliding scale   SubCutaneous three times a day before meals  ticagrelor 90 milliGRAM(s) Oral every 12 hours    MEDICATIONS  (PRN):      Allergies: none    Family History: HTN     Social History: non-smoker,     ====================  Vital Signs Last 24 Hrs  T(C): 36.8 (26 Nov 2021 12:12), Max: 37.1 (26 Nov 2021 08:18)  T(F): 98.2 (26 Nov 2021 12:12), Max: 98.7 (26 Nov 2021 08:18)  HR: 70 (26 Nov 2021 12:12) (70 - 83)  BP: 149/98 (26 Nov 2021 12:12) (132/82 - 179/101)  BP(mean): --  RR: 22 (26 Nov 2021 12:12) (18 - 22)  SpO2: 100% (26 Nov 2021 12:12) (98% - 100%)    Adult Advanced Hemodynamics Last 24 Hrs  CVP(mm Hg): --  CVP(cm H2O): --  CO: --  CI: --  PA: --  PA(mean): --  PCWP: --  SVR: --  SVRI: --  PVR: --  PVRI: --    Physical Exam:   General: NAD  HEENT: PERRL, EOMI, normal sclera and conjunctiva, no oral lesions  Neck: Supple, no JVD  Lungs: CTA bilaterally  Heart: RRR, normal S1S2, no murmurs/rubs/gallops  Abdomen: Soft, ND/NT  Extremities: 2+ peripheral pulses, no cyanosis/clubbing/edema, full ROM  Skin: Warm, well-perfused  Neuro: A&O x3, no focal deficits      ====================  Labs & Imaging:   CBC Full  -  ( 26 Nov 2021 07:16 )  WBC Count : 6.59 K/uL  RBC Count : 5.71 M/uL  Hemoglobin : 15.5 g/dL  Hematocrit : 46.9 %  Platelet Count - Automated : 196 K/uL  Mean Cell Volume : 82.1 fL  Mean Cell Hemoglobin : 27.1 pg  Mean Cell Hemoglobin Concentration : 33.0 gm/dL  Auto Neutrophil # : 4.58 K/uL  Auto Lymphocyte # : 1.34 K/uL  Auto Monocyte # : 0.46 K/uL  Auto Eosinophil # : 0.14 K/uL  Auto Basophil # : 0.03 K/uL  Auto Neutrophil % : 69.5 %  Auto Lymphocyte % : 20.3 %  Auto Monocyte % : 7.0 %  Auto Eosinophil % : 2.1 %  Auto Basophil % : 0.5 %    11-26    137  |  101  |  15  ----------------------------<  203<H>  4.0   |  22  |  0.78    Ca    9.2      26 Nov 2021 07:16    TPro  7.5  /  Alb  4.6  /  TBili  0.4  /  DBili  x   /  AST  30  /  ALT  25  /  AlkPhos  48  11-26    PT/INR - ( 26 Nov 2021 09:36 )   PT: 11.6 sec;   INR: 1.02 ratio         PTT - ( 26 Nov 2021 09:36 )  PTT:34.6 sec    CARDIAC MARKERS ( 26 Nov 2021 07:16 )  x     / x     / x     / x     / 19.8 ng/mL              ====================  Assessment & Plan:     56 year old man with HTN and NIDDM presents with chest pain and NSTEMI. Found to have 100% occlusion of diagonal. Admitted to CCU for further management.    #Neuro  -awake and alert  -no acute issues    #CV  #NSTEMI-  - chest pain started 1am on 11/26 and woke patient from sleep, now subsided  - Currently no ST segment changes noted on serial EKGs  - troponin 150  - CKMB 20  - s/p ASA, Brilinta load, and heparin gtt  - cardiac cath found 100% occlusion of diagonal --> s/p stent  - c/w Brilinta 90mg BID  - c/w aspirin 81mg daily  - c/w Lipitor 40mg daily  - continue with cardiac monitoring  - fu TTE  #HTN-  Patient will call for his home list    #Pulm  -on RA, O2 sat adequate  -CXR with clear lungs on 11/26    #Endo  #NIDDM-  -holding home Metformin 1000 mg BID and glipizide  -SSI  -fu A1c and lipid panel    #GI  -no acute issues  -DASH diet    #ID  -no acute issues  -afebrile, WBC wnl    #heme  heparin gtt for DVT ppx    ====================     CCU Accept Note    SRAVAN RATLIFF  56y  Patient is a 56y old  Male who presents with a chief complaint of chest pain    ====================  HPI & Hospital Course:   56 year-old M with MHx of HTN and NIDDM, who presented to [facility] on 11/26/2021 with chest pain. Mr. Ratliff states that the pain occurred at 1 AM this morning and woke him up from sleep. The pain is on the L side of his chest and is constantly bothering him. Pain is aggravated by movement of the upper extremities. Tried aspirin and ibuprofen, but got no relief. ROS otherwise negative. He decided to come the ED. He denies any cardiac history and does not smoke. 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 Currently, he says the pain has improved.     In the ED, his vitals were significant for hypertension with systolic BP b/t 133-179. Troponins elevated at 150. CKMB 20. Diagnosed with NSTEMI. He was loaded with aspirin, Brilinta, and heparin. Also received 1x nitroglycerin. Went for cardiac catheterization and found to have 100% occlusion of diagonal. S/p stent placement. Admitted to CCU for further management.      Past Medical History:   DM  HTN    Past Surgical History:   none    Home Medications:  metFORMIN 500 mg oral tablet: 1 tab(s) orally 2 times a day (02 Jul 2018 15:07)      Current Medications:   MEDICATIONS  (STANDING):  atorvastatin 40 milliGRAM(s) Oral at bedtime  dextrose 40% Gel 15 Gram(s) Oral once  dextrose 5%. 1000 milliLiter(s) (50 mL/Hr) IV Continuous <Continuous>  dextrose 5%. 1000 milliLiter(s) (100 mL/Hr) IV Continuous <Continuous>  dextrose 50% Injectable 25 Gram(s) IV Push once  dextrose 50% Injectable 12.5 Gram(s) IV Push once  dextrose 50% Injectable 25 Gram(s) IV Push once  glucagon  Injectable 1 milliGRAM(s) IntraMuscular once  insulin lispro (ADMELOG) corrective regimen sliding scale   SubCutaneous three times a day before meals  ticagrelor 90 milliGRAM(s) Oral every 12 hours    MEDICATIONS  (PRN):      Allergies: none    Family History: HTN     Social History: non-smoker,     ====================  Vital Signs Last 24 Hrs  T(C): 36.8 (26 Nov 2021 12:12), Max: 37.1 (26 Nov 2021 08:18)  T(F): 98.2 (26 Nov 2021 12:12), Max: 98.7 (26 Nov 2021 08:18)  HR: 70 (26 Nov 2021 12:12) (70 - 83)  BP: 149/98 (26 Nov 2021 12:12) (132/82 - 179/101)  BP(mean): --  RR: 22 (26 Nov 2021 12:12) (18 - 22)  SpO2: 100% (26 Nov 2021 12:12) (98% - 100%)    Adult Advanced Hemodynamics Last 24 Hrs  CVP(mm Hg): --  CVP(cm H2O): --  CO: --  CI: --  PA: --  PA(mean): --  PCWP: --  SVR: --  SVRI: --  PVR: --  PVRI: --    Physical Exam:   General: NAD  HEENT: PERRL, EOMI, normal sclera and conjunctiva, no oral lesions  Neck: Supple, no JVD  Lungs: CTA bilaterally  Heart: RRR, normal S1S2, no murmurs/rubs/gallops  Abdomen: Soft, ND/NT  Extremities: 2+ peripheral pulses, no cyanosis/clubbing/edema, full ROM  Skin: Warm, well-perfused  Neuro: A&O x3, no focal deficits      ====================  Labs & Imaging:   CBC Full  -  ( 26 Nov 2021 07:16 )  WBC Count : 6.59 K/uL  RBC Count : 5.71 M/uL  Hemoglobin : 15.5 g/dL  Hematocrit : 46.9 %  Platelet Count - Automated : 196 K/uL  Mean Cell Volume : 82.1 fL  Mean Cell Hemoglobin : 27.1 pg  Mean Cell Hemoglobin Concentration : 33.0 gm/dL  Auto Neutrophil # : 4.58 K/uL  Auto Lymphocyte # : 1.34 K/uL  Auto Monocyte # : 0.46 K/uL  Auto Eosinophil # : 0.14 K/uL  Auto Basophil # : 0.03 K/uL  Auto Neutrophil % : 69.5 %  Auto Lymphocyte % : 20.3 %  Auto Monocyte % : 7.0 %  Auto Eosinophil % : 2.1 %  Auto Basophil % : 0.5 %    11-26    137  |  101  |  15  ----------------------------<  203<H>  4.0   |  22  |  0.78    Ca    9.2      26 Nov 2021 07:16    TPro  7.5  /  Alb  4.6  /  TBili  0.4  /  DBili  x   /  AST  30  /  ALT  25  /  AlkPhos  48  11-26    PT/INR - ( 26 Nov 2021 09:36 )   PT: 11.6 sec;   INR: 1.02 ratio         PTT - ( 26 Nov 2021 09:36 )  PTT:34.6 sec    CARDIAC MARKERS ( 26 Nov 2021 07:16 )  x     / x     / x     / x     / 19.8 ng/mL              ====================  Assessment & Plan:     56 year old man with HTN and NIDDM presents with chest pain and NSTEMI. Found to have 100% occlusion of diagonal. Admitted to CCU for further management.    #Neuro  -awake and alert  -no acute issues    #CV  #NSTEMI-  - chest pain started 1am on 11/26 and woke patient from sleep, now subsided  - Currently no ST segment changes noted on serial EKGs  - troponin 150  - CKMB 20  - s/p ASA, Brilinta load, and heparin gtt  - cardiac cath found 100% occlusion of diagonal --> s/p stent  - c/w Brilinta 90mg BID  - c/w aspirin 81mg daily  - c/w Lipitor 40mg daily  - continue with cardiac monitoring  - fu TTE  #HTN-  Patient will call for his home list    #Pulm  -on RA, O2 sat adequate  -CXR with clear lungs on 11/26    #Endo  #NIDDM-  -holding home Metformin 1000 mg BID and glipizide  -SSI  -fu A1c and lipid panel    #GI  -no acute issues  -DASH diet    #ID  -no acute issues  -afebrile, WBC wnl    #heme  heparin SQ for DVT ppx    ====================

## 2021-11-26 NOTE — H&P CARDIOLOGY - ATTENDING COMMENTS
56 year old man with HTN and NIDDM presents with chest pain and NSTEMI.    #NSTEMI-  Chest pain started 1 AM this morning.  Currently no ST segment changes noted on serial EKGs, however cardiac biomarkers +.  Currently pain has subsided.  S/P ASA, Brilinta load, and heparin gtt.  Plan for cardiac cath today.  Patient in agreement.     #HTN-  Patient will call for his home list.    #NIDDM-  On Metformin 1000 mg BID and glipizide at home.  Sliding scale insulin.  Check HbA1c and lipid profile.    Patient agrees to proceed with cath today.

## 2021-11-26 NOTE — CHART NOTE - NSCHARTNOTEFT_GEN_A_CORE
s/p diag1 stent on ASA and Brilinta; CCU to follow up Brilinta coverage prior to discharge as no documented insurance at [facility] at this time. Patient already in CCU.   CCU NP aware

## 2021-11-26 NOTE — ED ADULT NURSE NOTE - OBJECTIVE STATEMENT
pt received to room 25, c/o sharp constant worsening left sided chest pain that woke pt from sleep around 1am. states pain is been worsening despite various home remedies. NSR on tele. 18G IV placed right MD Oral VELA at bedside for evaluation.

## 2021-11-26 NOTE — ED PROVIDER NOTE - OBJECTIVE STATEMENT
55yo M hx of HTN, HLD, DM comes to ED*** 55yo M hx of HTN, HLD, DM comes to ED for CP. L sided, woke him from sleep 5hrs before ED, constant, has "hot flashes," did not improve w/ ibuprofen or aspirin 162mg. 57yo M hx of HTN, HLD, DM comes to ED for CP. L sided, woke him from sleep 5hrs before ED, constant, no radiation, "feels hot," did not improve w/ ibuprofen or aspirin 162mg, never had this pain before. Never had cardiac work up. Is [place]. Denies fever, abdominal pain, n/v, change in urine or bowel, or leg symptoms.

## 2021-11-26 NOTE — ED PROVIDER NOTE - ATTENDING CONTRIBUTION TO CARE
55yo M nonsmoker PMHX HTN, HLD, DM, and no prior cardiac stress test or cath, p/w acute onset constant L sided chest pain described as sharp pressure. No radiation of pain, no back pain, no sob, no n/v, no diaphoresis or syncope.  Pain is worse with movement of arms and shoulders. Took ibuprofen and aspirin 162mg prior to ED arrival without improvement so came in.     General: Patient alert in no apparent distress  Skin: Dry and intact  HEENT: Head atraumatic. Oral mucosa moist.   Eyes: Conjunctiva normal  Cardiac: Regular rhythm and rate. No pretibial edema b/l. 2+ distal pulses in all extremities  Respiratory: Lungs clear b/l and symmetric. No respiratory distress. Able to speak in complete sentences.  Gastrointestinal: Abdomen soft, nondistended, nontender  Musculoskeletal: Moves all extremities spontaneously. +L anterior rib tenderness to palpation  Neurological: alert and oriented to person, place, and time  Psychiatric: Calm and cooperative    EKG nsr with no acute ischemic abnormalities     a/p  highest concern for ACS vs MSK etiology  PE and aortic dissection less likely  cxr, labs with trop, NTG, aspirin   will likely require admission vs CDU for cardiac testing based on risk factors and history

## 2021-11-26 NOTE — ED ADULT NURSE REASSESSMENT NOTE - NS ED NURSE REASSESS COMMENT FT1
Pt received in room 25 from night shift RN. Vital signs reassessed as noted. Pt resting in stretcher, appears comfortable. Respirations even and unlabored. Pt denies chest pain, SOB at this time. Will continue to monitor.

## 2021-11-26 NOTE — ED PROVIDER NOTE - PROGRESS NOTE DETAILS
MD Rosales:  CKMB 20; Trop 150.  H&P c/w NSTEMI.  Consult fellow contacted.  Cath lab contacted (still with mild pain) to discuss possible cath later today; Dr. Mixon agrees; requesting Heparin & Brilinta.  Will admit to teledoc of the day.  Dr. Krueger.

## 2021-11-26 NOTE — ED ADULT NURSE REASSESSMENT NOTE - NS ED NURSE REASSESS COMMENT FT1
Report given to SANA2 RN. PT aaox4. PT denies chest pain, sob, dizziness, N+V at this time. Pt aware of plan of care.

## 2021-11-26 NOTE — CONSULT NOTE ADULT - SUBJECTIVE AND OBJECTIVE BOX
Mr. De La Cruz is a very pleasant 56M with a PMhx of Hypertension, non-insulin dependent diabetes mellitus Type II who presented to the ER with sudden onset chest pain, left sided that woke him up at 1AM. Patient states it was constant and nothing made it better. Tries ASA, ibuprofen without relief. This is the first time he has experienced pain like this - no prior anginal symptoms. Normally he is able to exert himself without any anginal symptoms or shortness of breath. No associated fevers, chills, nausea, vomiting, change in vision, bluriness, headaches, or dizziness. No abdominal pain/urinary symptoms or diarrhea. Denies any lower extremity swelling or rashes. Currently is s/p cardiac cath. Tolerated procedure well.         REVIEW OF SYSTEMS  - see HPI        Home Medications:  metFORMIN 500 mg oral tablet: 1 tab(s) orally 2 times a day (02 Jul 2018 15:07)      PAST MEDICAL & SURGICAL HISTORY:  Hypertension    Diabetes    No significant past surgical history        FAMILY HISTORY:  FH: HTN (hypertension) (Mother)        SOCIAL HISTORY: no smoking hx reported       Vital Signs Last 24 Hrs  T(C): 36.8 (26 Nov 2021 19:30), Max: 37.2 (26 Nov 2021 19:03)  T(F): 98.2 (26 Nov 2021 19:30), Max: 98.9 (26 Nov 2021 19:03)  HR: 66 (26 Nov 2021 21:00) (63 - 83)  BP: 157/97 (26 Nov 2021 21:00) (132/82 - 179/101)  BP(mean): 114 (26 Nov 2021 21:00) (107 - 118)  RR: 21 (26 Nov 2021 21:00) (17 - 22)  SpO2: 100% (26 Nov 2021 21:00) (98% - 100%)   I&O's Detail    26 Nov 2021 07:01  -  26 Nov 2021 22:03  --------------------------------------------------------  IN:  Total IN: 0 mL    OUT:    Voided (mL): 400 mL  Total OUT: 400 mL    Total NET: -400 mL        Daily Height in cm: 187.96 (26 Nov 2021 19:03)    Daily     Physical Exam:   General: awake, alert, oriented X 3, well nourished, no acute distress  HEENT: NACT, EOMI. anicteric sclera  CV: RRR, normal S1 + S2, no m/r/g.  no edema  Lungs: normal work of breathing, no wheezing, rales, rhonchi. no accessory muscle use   Abd: soft, non-tender to palpation. non-distended. no guarding. + bowel sounds  Ext: No cyanosis or clubbing  Skin: no rashes. or hematoma   Neuro: no gross focal deficits   Psych: Calm and cooperative       MEDICATIONS  (STANDING):  atorvastatin 40 milliGRAM(s) Oral at bedtime  dextrose 40% Gel 15 Gram(s) Oral once  dextrose 5%. 1000 milliLiter(s) (50 mL/Hr) IV Continuous <Continuous>  dextrose 5%. 1000 milliLiter(s) (100 mL/Hr) IV Continuous <Continuous>  dextrose 50% Injectable 25 Gram(s) IV Push once  dextrose 50% Injectable 12.5 Gram(s) IV Push once  dextrose 50% Injectable 25 Gram(s) IV Push once  glucagon  Injectable 1 milliGRAM(s) IntraMuscular once  heparin   Injectable 5000 Unit(s) SubCutaneous every 12 hours  influenza   Vaccine 0.5 milliLiter(s) IntraMuscular once  insulin lispro (ADMELOG) corrective regimen sliding scale   SubCutaneous three times a day before meals  metoprolol tartrate 12.5 milliGRAM(s) Oral two times a day  ticagrelor 90 milliGRAM(s) Oral every 12 hours    MEDICATIONS  (PRN):  acetaminophen     Tablet .. 650 milliGRAM(s) Oral every 6 hours PRN Mild Pain (1 - 3)  morphine  - Injectable 2 milliGRAM(s) IV Push once PRN Moderate Pain (4 - 6)                LABS:                        15.5   6.59  )-----------( 196      ( 26 Nov 2021 07:16 )             46.9     11-26    137  |  101  |  15  ----------------------------<  203<H>  4.0   |  22  |  0.78    Ca    9.2      26 Nov 2021 07:16    TPro  7.5  /  Alb  4.6  /  TBili  0.4  /  DBili  x   /  AST  30  /  ALT  25  /  AlkPhos  48  11-26    CARDIAC MARKERS ( 26 Nov 2021 07:16 )  x     / x     / x     / x     / 19.8 ng/mL      PT/INR - ( 26 Nov 2021 09:36 )   PT: 11.6 sec;   INR: 1.02 ratio         PTT - ( 26 Nov 2021 09:36 )  PTT:34.6 sec  CAPILLARY BLOOD GLUCOSE      POCT Blood Glucose.: 253 mg/dL (26 Nov 2021 19:05)  POCT Blood Glucose.: 123 mg/dL (26 Nov 2021 16:03)  POCT Blood Glucose.: 169 mg/dL (26 Nov 2021 12:27)  POCT Blood Glucose.: 191 mg/dL (26 Nov 2021 05:46)

## 2021-11-26 NOTE — CONSULT NOTE ADULT - ASSESSMENT
Mr. De La Cruz is a very pleasant 56M with a PMhx of Hypertension, non-insulin dependent diabetes mellitus Type II who presented to the ER with sudden onset chest pain, left sided that woke him up at 1AM.    NSTEMI   - troponin elevation 137 --> 205   - no ST changes appreciated   - s/p cardiac cath with with PCI of ostial diagnoal occlusion on 11/26/2021   - cardiology following   - dual antiplatelet therapy, ASA 81 mg PO Qdaily, Brilinta 90 mg po BID  - continue Lipitor 40 mg po qdaily; AM Lipid panel pending   - ECHO pending   - continue Lopressor     Diabetes Mellitus Type II   - home regimen is Metformin and Glipizide; HOLD both while in hospital   - ISS ACHS; monitor for hypo/hyperglycemia   - HbA1c check in AM   - patient to avoid high glycemic index foods     Hypertension   - elevated pressures;   - started on Lopressor tonight  - monitor for hypo/hypertension   - DASH diet     DVT PPX Heparin     Discussed with patient, all questions answered, agreeable with plan.     Thank you for allowing me to take part in the care for this patient.

## 2021-11-26 NOTE — ED PROVIDER NOTE - CLINICAL SUMMARY MEDICAL DECISION MAKING FREE TEXT BOX
[place] pt w/ multiple cardiac risk factors w/o previous work up here for cp. VSS. Exam w/o signs of dvt. High concern for ACS, less likely dissection or pe. Labs, ekg, cxr, cardiac markers, aspirin, nitro, reassess.

## 2021-11-26 NOTE — H&P CARDIOLOGY - HISTORY OF PRESENT ILLNESS
This is a 56 year old man with HTN and NIDDM who presented to [facility] on 11/26/2021 with chest pain.  Mr. De La Cruz states that the pain occurred at 1 AM this morning and woke him up from sleep.  He decided to come the ED.  He denies any cardiac history and does not smoke.  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  Currently, he says the pain has improved.

## 2021-11-27 VITALS
HEART RATE: 62 BPM | OXYGEN SATURATION: 100 % | SYSTOLIC BLOOD PRESSURE: 129 MMHG | DIASTOLIC BLOOD PRESSURE: 97 MMHG | RESPIRATION RATE: 20 BRPM

## 2021-11-27 LAB
A1C WITH ESTIMATED AVERAGE GLUCOSE RESULT: 8.2 % — HIGH (ref 4–5.6)
ALBUMIN SERPL ELPH-MCNC: 4 G/DL — SIGNIFICANT CHANGE UP (ref 3.3–5)
ALP SERPL-CCNC: 45 U/L — SIGNIFICANT CHANGE UP (ref 40–120)
ALT FLD-CCNC: 22 U/L — SIGNIFICANT CHANGE UP (ref 4–41)
ANION GAP SERPL CALC-SCNC: 8 MMOL/L — SIGNIFICANT CHANGE UP (ref 7–14)
AST SERPL-CCNC: 49 U/L — HIGH (ref 4–40)
BILIRUB SERPL-MCNC: 1.1 MG/DL — SIGNIFICANT CHANGE UP (ref 0.2–1.2)
BUN SERPL-MCNC: 12 MG/DL — SIGNIFICANT CHANGE UP (ref 7–23)
CALCIUM SERPL-MCNC: 9.2 MG/DL — SIGNIFICANT CHANGE UP (ref 8.4–10.5)
CHLORIDE SERPL-SCNC: 100 MMOL/L — SIGNIFICANT CHANGE UP (ref 98–107)
CHOLEST SERPL-MCNC: 143 MG/DL — SIGNIFICANT CHANGE UP
CK MB CFR SERPL CALC: 29.5 NG/ML — HIGH
CO2 SERPL-SCNC: 27 MMOL/L — SIGNIFICANT CHANGE UP (ref 22–31)
CREAT SERPL-MCNC: 0.67 MG/DL — SIGNIFICANT CHANGE UP (ref 0.5–1.3)
ESTIMATED AVERAGE GLUCOSE: 189 — SIGNIFICANT CHANGE UP
GLUCOSE SERPL-MCNC: 202 MG/DL — HIGH (ref 70–99)
HCT VFR BLD CALC: 47.4 % — SIGNIFICANT CHANGE UP (ref 39–50)
HDLC SERPL-MCNC: 53 MG/DL — SIGNIFICANT CHANGE UP
HGB BLD-MCNC: 15.3 G/DL — SIGNIFICANT CHANGE UP (ref 13–17)
LIPID PNL WITH DIRECT LDL SERPL: 61 MG/DL — SIGNIFICANT CHANGE UP
MAGNESIUM SERPL-MCNC: 1.9 MG/DL — SIGNIFICANT CHANGE UP (ref 1.6–2.6)
MCHC RBC-ENTMCNC: 27 PG — SIGNIFICANT CHANGE UP (ref 27–34)
MCHC RBC-ENTMCNC: 32.3 GM/DL — SIGNIFICANT CHANGE UP (ref 32–36)
MCV RBC AUTO: 83.7 FL — SIGNIFICANT CHANGE UP (ref 80–100)
NON HDL CHOLESTEROL: 90 MG/DL — SIGNIFICANT CHANGE UP
NRBC # BLD: 0 /100 WBCS — SIGNIFICANT CHANGE UP
NRBC # FLD: 0 K/UL — SIGNIFICANT CHANGE UP
PHOSPHATE SERPL-MCNC: 3.1 MG/DL — SIGNIFICANT CHANGE UP (ref 2.5–4.5)
PLATELET # BLD AUTO: 174 K/UL — SIGNIFICANT CHANGE UP (ref 150–400)
POTASSIUM SERPL-MCNC: 3.9 MMOL/L — SIGNIFICANT CHANGE UP (ref 3.5–5.3)
POTASSIUM SERPL-SCNC: 3.9 MMOL/L — SIGNIFICANT CHANGE UP (ref 3.5–5.3)
PROT SERPL-MCNC: 6.6 G/DL — SIGNIFICANT CHANGE UP (ref 6–8.3)
RBC # BLD: 5.66 M/UL — SIGNIFICANT CHANGE UP (ref 4.2–5.8)
RBC # FLD: 12.9 % — SIGNIFICANT CHANGE UP (ref 10.3–14.5)
SODIUM SERPL-SCNC: 135 MMOL/L — SIGNIFICANT CHANGE UP (ref 135–145)
TRIGL SERPL-MCNC: 145 MG/DL — SIGNIFICANT CHANGE UP
TROPONIN T, HIGH SENSITIVITY RESULT: 575 NG/L — CRITICAL HIGH
WBC # BLD: 7.27 K/UL — SIGNIFICANT CHANGE UP (ref 3.8–10.5)
WBC # FLD AUTO: 7.27 K/UL — SIGNIFICANT CHANGE UP (ref 3.8–10.5)

## 2021-11-27 PROCEDURE — 93306 TTE W/DOPPLER COMPLETE: CPT | Mod: 26

## 2021-11-27 RX ORDER — BNT162B2 0.23 MG/2.25ML
0.3 INJECTION, SUSPENSION INTRAMUSCULAR ONCE
Refills: 0 | Status: DISCONTINUED | OUTPATIENT
Start: 2021-11-27 | End: 2021-11-27

## 2021-11-27 RX ORDER — LOSARTAN POTASSIUM 100 MG/1
1 TABLET, FILM COATED ORAL
Qty: 30 | Refills: 0
Start: 2021-11-27 | End: 2021-12-26

## 2021-11-27 RX ORDER — LOSARTAN POTASSIUM 100 MG/1
25 TABLET, FILM COATED ORAL DAILY
Refills: 0 | Status: DISCONTINUED | OUTPATIENT
Start: 2021-11-27 | End: 2021-11-27

## 2021-11-27 RX ORDER — CLOPIDOGREL BISULFATE 75 MG/1
1 TABLET, FILM COATED ORAL
Qty: 30 | Refills: 0
Start: 2021-11-27 | End: 2021-12-26

## 2021-11-27 RX ORDER — CLOPIDOGREL BISULFATE 75 MG/1
600 TABLET, FILM COATED ORAL ONCE
Refills: 0 | Status: COMPLETED | OUTPATIENT
Start: 2021-11-27 | End: 2021-11-27

## 2021-11-27 RX ORDER — ATORVASTATIN CALCIUM 80 MG/1
40 TABLET, FILM COATED ORAL AT BEDTIME
Refills: 0 | Status: DISCONTINUED | OUTPATIENT
Start: 2021-11-27 | End: 2021-11-27

## 2021-11-27 RX ORDER — ATORVASTATIN CALCIUM 80 MG/1
1 TABLET, FILM COATED ORAL
Qty: 30 | Refills: 0
Start: 2021-11-27 | End: 2021-12-26

## 2021-11-27 RX ORDER — TICAGRELOR 90 MG/1
1 TABLET ORAL
Qty: 60 | Refills: 0
Start: 2021-11-27 | End: 2021-12-26

## 2021-11-27 RX ORDER — ASPIRIN/CALCIUM CARB/MAGNESIUM 324 MG
1 TABLET ORAL
Qty: 30 | Refills: 0
Start: 2021-11-27 | End: 2021-12-26

## 2021-11-27 RX ORDER — METFORMIN HYDROCHLORIDE 850 MG/1
1 TABLET ORAL
Qty: 0 | Refills: 0 | DISCHARGE

## 2021-11-27 RX ORDER — CLOPIDOGREL BISULFATE 75 MG/1
75 TABLET, FILM COATED ORAL DAILY
Refills: 0 | Status: DISCONTINUED | OUTPATIENT
Start: 2021-11-28 | End: 2021-11-27

## 2021-11-27 RX ORDER — ATORVASTATIN CALCIUM 80 MG/1
1 TABLET, FILM COATED ORAL
Qty: 0 | Refills: 0 | DISCHARGE

## 2021-11-27 RX ORDER — METFORMIN HYDROCHLORIDE 850 MG/1
1 TABLET ORAL
Qty: 60 | Refills: 0
Start: 2021-11-27 | End: 2021-12-26

## 2021-11-27 RX ORDER — SODIUM CHLORIDE 9 MG/ML
1000 INJECTION, SOLUTION INTRAVENOUS
Refills: 0 | Status: DISCONTINUED | OUTPATIENT
Start: 2021-11-27 | End: 2021-11-27

## 2021-11-27 RX ORDER — AMLODIPINE BESYLATE AND BENAZEPRIL HYDROCHLORIDE 10; 20 MG/1; MG/1
1 CAPSULE ORAL
Qty: 0 | Refills: 0 | DISCHARGE

## 2021-11-27 RX ADMIN — HEPARIN SODIUM 5000 UNIT(S): 5000 INJECTION INTRAVENOUS; SUBCUTANEOUS at 06:24

## 2021-11-27 RX ADMIN — Medication 81 MILLIGRAM(S): at 12:16

## 2021-11-27 RX ADMIN — CLOPIDOGREL BISULFATE 600 MILLIGRAM(S): 75 TABLET, FILM COATED ORAL at 16:31

## 2021-11-27 RX ADMIN — Medication 4: at 12:15

## 2021-11-27 RX ADMIN — LOSARTAN POTASSIUM 25 MILLIGRAM(S): 100 TABLET, FILM COATED ORAL at 12:16

## 2021-11-27 RX ADMIN — TICAGRELOR 90 MILLIGRAM(S): 90 TABLET ORAL at 06:22

## 2021-11-27 RX ADMIN — Medication 2: at 16:31

## 2021-11-27 RX ADMIN — Medication 4: at 08:07

## 2021-11-27 RX ADMIN — Medication 12.5 MILLIGRAM(S): at 06:22

## 2021-11-27 NOTE — PROGRESS NOTE ADULT - SUBJECTIVE AND OBJECTIVE BOX
FOLLOW UP:  NSTEMI  SUBJECTIVE/OBSERVATIONS: Patient seen and examined with no complaints of chest pain or shortness of breath.  OVERNIGHT EVENTS:  No events s/p LHC with stent to 100% lesion in the ostial diagonal branch  TELE EVENTS:   normal sinus  Vital Signs Last 24 Hrs  T(C): 36.8 (2021 04:00), Max: 37.2 (2021 19:03)  T(F): 98.3 (2021 04:00), Max: 98.9 (2021 19:03)  HR: 59 (2021 06:00) (59 - 89)  BP: 112/73 (2021 06:00) (112/73 - 170/73)  BP(mean): 86 (2021 06:00) (86 - 118)  RR: 10 (2021 06:00) (10 - 23)  SpO2: 95% (2021 06:00) (95% - 100%)  I&O's Summary    2021 07:01  -  2021 07:00  --------------------------------------------------------  IN: 350 mL / OUT: 1000 mL / NET: -650 mL        MEDICATIONS:  aspirin enteric coated 81 milliGRAM(s) Oral daily  heparin   Injectable 5000 Unit(s) SubCutaneous every 12 hours  metoprolol tartrate 12.5 milliGRAM(s) Oral two times a day  ticagrelor 90 milliGRAM(s) Oral every 12 hours  acetaminophen     Tablet .. 650 milliGRAM(s) Oral every 6 hours PRN  morphine  - Injectable 2 milliGRAM(s) IV Push once PRN  atorvastatin 40 milliGRAM(s) Oral at bedtime  dextrose 40% Gel 15 Gram(s) Oral once  dextrose 50% Injectable 25 Gram(s) IV Push once  dextrose 50% Injectable 12.5 Gram(s) IV Push once  dextrose 50% Injectable 25 Gram(s) IV Push once  glucagon  Injectable 1 milliGRAM(s) IntraMuscular once  insulin lispro (ADMELOG) corrective regimen sliding scale   SubCutaneous three times a day before meals    dextrose 5%. 1000 milliLiter(s) IV Continuous <Continuous>  dextrose 5%. 1000 milliLiter(s) IV Continuous <Continuous>  influenza   Vaccine 0.5 milliLiter(s) IntraMuscular once      REVIEW OF SYSTEMS:  Complete 10point ROS negative.    PHYSICAL EXAM:  General: NAD  Cardiovascular: Normal S1 S2, No JVD, No murmurs, No edema  Respiratory: Lungs clear to auscultation	  Gastrointestinal:  Soft, Non-tender, + BS	  Skin: warm and dry, No rashes, No ecchymoses, No cyanosis	  Extremities: right radial site is clean, dry, intact with strong palpable pulse,Normal range of motion, No clubbing, cyanosis or edema  Vascular: Peripheral pulses palpable 2+ bilaterally    LABS:	 	    CBC Full  -  ( 2021 05:01 )  WBC Count : 7.27 K/uL  Hemoglobin : 15.3 g/dL  Hematocrit : 47.4 %  Platelet Count - Automated : 174 K/uL  Mean Cell Volume : 83.7 fL  Mean Cell Hemoglobin : 27.0 pg  Mean Cell Hemoglobin Concentration : 32.3 gm/dL  Auto Neutrophil # : x  Auto Lymphocyte # : x  Auto Monocyte # : x  Auto Eosinophil # : x  Auto Basophil # : x  Auto Neutrophil % : x  Auto Lymphocyte % : x  Auto Monocyte % : x  Auto Eosinophil % : x  Auto Basophil % : x        135  |  100  |  12  ----------------------------<  202<H>  3.9   |  27  |  0.67      137  |  101  |  15  ----------------------------<  203<H>  4.0   |  22  |  0.78    Ca    9.2      2021 05:01  Ca    9.2      2021 07:16  Phos  3.1       Mg     1.90         TPro  6.6  /  Alb  4.0  /  TBili  1.1  /  DBili  x   /  AST  49<H>  /  ALT  22  /  AlkPhos  45    TPro  7.5  /  Alb  4.6  /  TBili  0.4  /  DBili  x   /  AST  30  /  ALT  25  /  AlkPhos  48        proBNP: Serum Pro-Brain Natriuretic Peptide: 166 pg/mL ( @ 07:16)      	Lipid Profile in AM (11.27.21 @ 05:01)   Cholesterol, Serum: 143 mg/dL   Triglycerides, Serum: 145 mg/dL   HDL Cholesterol, Serum: 53 mg/dL   Non HDL Cholesterol: 90: Patients Atherosclerotic Cardiovascular Disease (ASCVD) Risk   Optimal Level (mg/dL)   LDL Cholesterol (LDL-C) 61    < from: Cardiac Catheterization (21 @ 13:50) >    [address] [address]  [phone]  Cath Lab Report -- Comprehensive Report  Patient: SRAVAN RATLIFF  Study date: 2021  Account number: [id number]  MR number: [id number]  : 1965  Gender: Male  Race: A  Case Physician(s):  KEEGAN Quinones M.D.  Fellow:  Mahi Rocha MD  Referring Physician:  Darius Krueger M.D.  INDICATIONS: Initial NSTEMI.  HISTORY: There was no prior cardiac history. The patient has hypertension,  oral hypoglycemic-treated diabetes, and medication-treated dyslipidemia.  PROCEDURE:  --  Left heart catheterization with ventriculography.  --  Left coronary angiography.  --  Right coronary angiography.  --  Intervention on D1: balloon angioplasty, stent.  TECHNIQUE: The risks and alternatives of the procedures and conscious  sedation were explained to the patient and informed consent was obtained.  Cardiac catheterization performed electively. Coronary intervention  performed electively.  Local anesthetic given. Right radial artery access. Left heart  catheterization. Ventriculography was performed. Left coronary artery  angiography. The vessel was injected utilizing a catheter. Right coronary  artery angiography. The vessel was injected utilizing a catheter.  RADIATION EXPOSURE: 16.5 min. A successful balloon angioplasty with stent  was performed on the 100 % lesion in the 1st diagonal. Following  intervention there was a 1 % residual stenosis. According to the ACC/AHA  classification system, this lesion was a type C lesion. There was SANDOVAL 0  flow before the procedure and SANDOVAL 3 flow after the procedure. There were  no site complications. Vessel setup was performed. A 6 Fr. JL 3.5 Launcher  guiding catheter was used to intubate the vessel. A Kigo Blue 190CM  wire was used to cross the lesion. Balloon angioplasty was performed,  using a 2.0 X 15 Euphora RX balloon, with 2 inflations and a maximum  inflation pressure of 14 june. A 2.25 X 16 Synergy XD drug-eluting stent  was placed across the lesion and deployed at a maximum inflation pressure  of 10 june. Balloon angioplasty was performed, using a 2.25 X 16 SYNERGY XD  stent balloon (pulled back a few mm after stent deployment), with 1  inflations and a maximum inflation pressure of 18 june.  CONTRAST GIVEN: Omnipaque 35 ml. Omnipaque 45 ml.  MEDICATIONS GIVEN: Verapamil (Isoptin, Calan, Covera), 2.5 mg, IA.  Nitroglycerin, 50 mcg, intracoronary. Heparin, 4000 units, IV. Heparin,  5000 units, IV. 2% Lidocaine, 3 ml, subcutaneously. Cardene, 100 mcg. 0.9%  Normal saline, 51 ml, IV.  VENTRICLES: Analysis of regional contractile function demonstrated small  area of anterolateral akinesis. Global left ventricular function was  mildly depressed. EF estimated was 45 %.  CORONARY VESSELS: The coronary circulation is right dominant.  LM:   --  LM: Normal.  LAD:   --  Proximal LAD: The vessel was moderately calcified. There was a  tubular 20 % stenosis in the proximal third of the vessel segment.  --  Mid LAD: There was a tubular 30 % stenosis in the proximal third of the  vessel segment, just after S1. In a second lesion, there was a discrete 20  % stenosis in the middle third of the vessel segment.  --  D1: There was a 100 % stenosis at the ostium of the vessel segment.  There was SANDOVAL grade 0 flow through the vessel (no flow).  CX:   --  OM1: There was a discrete 60 % stenosis in the proximal third of  the vessel segment. There was SANDOVAL grade 3 flow through the vessel (brisk  flow).  RCA:   --  RCA: Thevessel was mildly calcified.  --  Mid RCA: There was a tubular 30 % stenosis in the middle third of the  vessel segment.  COMPLICATIONS: No complications occurred during the cath lab visit.  SUMMARY:  1ST LESION INTERVENTIONS: A successful balloon angioplasty with stent was  performed on the 100 % lesion in the 1st diagonal. Following intervention  there was a 1 % residual stenosis.  DIAGNOSTIC RECOMMENDATIONS: PCI of ostial diagonal occlusion for treatment  of NSTEMI.  INTERVENTIONAL RECOMMENDATIONS: Add aspirin, 81 mg, PO, daily. Add  ticagrelor 90 mg twice daily. Patient management should include aggressive  medical therapy.  Prepared and signed by  Wagner Mixon M.D.  Signed 2021 16:56:24  HEMODYNAMIC TABLES  Pressures:  Intervention  Pressures:  - HR: 75  Pressures:  - Rhythm:  Pressures:  -- Aortic Pressure (S/D/M): 164/92/123  Pressures:  NO PHASE  Pressures:  - HR: 73  Pressures:  - Rhythm:  Pressures:  -- Left Ventricle (s/edp): 158/21/--  Pressures:  Post Angio  Pressures:  - HR: 71  Pressures:  - Rhythm:  Pressures:  -- Aortic Pressure (S/D/M): 155/83/113  Pressures:  -- Left Ventricle (s/edp): 155/20/--  Outputs:  Intervention  Outputs:  -- OUTPUTS: O2 consumption: 267.15  Outputs:  -- OUTPUTS: Vo2 Indexed: 125.00  Outputs:  NO PHASE  Outputs:  -- CALCULATIONS[age]  Outputs:  -- CALCULATIONS: Body Surface Area: 2.14  Outputs:  -- CALCULATIONS: Height in cm: 188.00  Outputs:  -- CALCULATIONS: Sex: Male  Outputs:  -- CALCULATIONS: Weight in k.20  Outputs:  -- OUTPUTS: O2 consumption: 267.15  Outputs:  -- OUTPUTS: Vo2 Indexed: 125.00  Outputs:  Post Angio  Outputs:  -- OUTPUTS: O2 consumption: 267.15  Outputs:  -- OUTPUTS: Vo2 Indexed: 125.00    < end of copied text >

## 2021-11-27 NOTE — PROGRESS NOTE ADULT - SUBJECTIVE AND OBJECTIVE BOX
Patient seen and examined at the bedside this AM - no acute overnight events reported. Denies any chest pain/chest pressure this AM. Feeling well, no acute complaints. Denies any lightheadedness/dizziness. No change in vision or bluriness. No abdominal pain. No urinary symptoms or diarrhea. Tolerating diet.       =========================================================================================  T(C): 37.1 (11-27-21 @ 10:00), Max: 37.2 (11-26-21 @ 19:03)  HR: 68 (11-27-21 @ 10:00) (59 - 89)  BP: 157/99 (11-27-21 @ 10:00) (112/73 - 170/73)  RR: 23 (11-27-21 @ 10:00) (10 - 23)  SpO2: 98% (11-27-21 @ 10:00) (95% - 100%)    PHYSICAL EXAM.  General: NAD, lying in bed, comfortable. alert and oriented X 3   HEENT: NACT, EOMI. anicteric sclera  CV: RRR, normal S1 + S2, no m/r/g.  no edema  Lungs: normal work of breathing, no wheezing, rales, rhonchi. no accessory muscle use   Abd: soft, non-tender to palpation. non-distended. no guarding. + bowel sounds  Ext: No cyanosis or clubbing  Skin: right forearm dressing in place without hematoma.   Neuro: no gross focal deficits   Psych: Calm and cooperative         I&O's Summary    26 Nov 2021 07:01  -  27 Nov 2021 07:00  --------------------------------------------------------  IN: 350 mL / OUT: 1000 mL / NET: -650 mL      Daily Height in cm: 187.96 (26 Nov 2021 19:03)    Daily     =========================================================================================  LABS.    11-27    135  |  100  |  12  ----------------------------<  202<H>  3.9   |  27  |  0.67    Ca    9.2      27 Nov 2021 05:01  Phos  3.1     11-27  Mg     1.90     11-27    TPro  6.6  /  Alb  4.0  /  TBili  1.1  /  DBili  x   /  AST  49<H>  /  ALT  22  /  AlkPhos  45  11-27                          15.3   7.27  )-----------( 174      ( 27 Nov 2021 05:01 )             47.4     LIVER FUNCTIONS - ( 27 Nov 2021 05:01 )  Alb: 4.0 g/dL / Pro: 6.6 g/dL / ALK PHOS: 45 U/L / ALT: 22 U/L / AST: 49 U/L / GGT: x           PT/INR - ( 26 Nov 2021 09:36 )   PT: 11.6 sec;   INR: 1.02 ratio         PTT - ( 26 Nov 2021 09:36 )  PTT:34.6 sec  CARDIAC MARKERS ( 27 Nov 2021 05:01 )  x     / x     / x     / x     / 29.5 ng/mL  CARDIAC MARKERS ( 26 Nov 2021 07:16 )  x     / x     / x     / x     / 19.8 ng/mL          COVID-19 PCR: NotDetec (26 Nov 2021 09:28)    =========================================================================================  IMAGING.     =========================================================================================    DIET.    Diet, Regular:   Consistent Carbohydrate Evening Snack (CSTCHOSN)  DASH/TLC Sodium & Cholesterol Restricted (DASH) (11-26-21 @ 09:51)    =========================================================================================    HOSPITAL MEDS.    MEDICATIONS  (STANDING):  aspirin enteric coated 81 milliGRAM(s) Oral daily  atorvastatin 40 milliGRAM(s) Oral at bedtime  glucagon  Injectable 1 milliGRAM(s) IntraMuscular once  heparin   Injectable 5000 Unit(s) SubCutaneous every 12 hours  influenza   Vaccine 0.5 milliLiter(s) IntraMuscular once  insulin lispro (ADMELOG) corrective regimen sliding scale   SubCutaneous three times a day before meals  losartan 25 milliGRAM(s) Oral daily  ticagrelor 90 milliGRAM(s) Oral every 12 hours    MEDICATIONS  (PRN):  acetaminophen     Tablet .. 650 milliGRAM(s) Oral every 6 hours PRN Mild Pain (1 - 3)

## 2021-11-27 NOTE — DISCHARGE NOTE PROVIDER - HOSPITAL COURSE
Patient is a 56 year old man with HTN and NIDDM presents with chest pain and NSTEMI. Found to have 100% occlusion of diagonal. Admitted to CCU s/p left heart cardiac cath with NSTEMI. Patient started on DAPT, aspirin and brilinta, lipitor 80, toprol xl 25 and losartan 25. ECHO pending. Risk factor modification includes aggressive   management of his diabetes. HGBA1c is 8.2 and management of hyperlipidemia and hypertension. Patient is to follow up with cardiologist in 2 weeks    #CV  #NSTEMI-  - s/p LHC with stent to ostial diagonal   -continue DAPT, lipitor 80  -change lopressor 12.5 bid to toprol xl 25  -initiate losartan 25 qd today  -ECHO today  -possible d/c home later today with follow up    HTN:  -BP is stable   will resume ARB and BB    HLD  -lipid panel reviewed    #Pulm  -on RA, O2 sat adequate  -CXR with clear lungs on 11/26    #Endo  #NIDDM-  -HGBA1c is 8.2  -holding home Metformin 1000 mg BID and glipizide  -SSI   Patient is a 56 year old man with HTN and NIDDM presents with chest pain and NSTEMI. Found to have 100% occlusion of diagonal. Admitted to CCU s/p left heart cardiac cath with NSTEMI. Patient started on DAPT, aspirin and brilinta, lipitor 80, toprol xl 25 and losartan 25. ECHO pending. Risk factor modification includes aggressive   management of his diabetes. HGBA1c is 8.2 and management of hyperlipidemia and hypertension. Patient is to follow up with cardiologist in 2 weeks. Patient is following with   PCP in 2weeks. He reports his PCP is also the doctor who has been managing his diabetes.   Patient is a 56 year old man with HTN and NIDDM presents with chest pain and NSTEMI. Found to have 100% occlusion of diagonal. Admitted to CCU s/p left heart cardiac cath with NSTEMI. Patient started on DAPT, aspirin and brilinta, lipitor 80, toprol xl 25 and losartan 25. ECHO pending. Risk factor modification includes aggressive   management of his diabetes. HGBA1c is 8.2 and management of hyperlipidemia and hypertension. Patient is to follow up with cardiologist in 2 weeks. Patient is following with   PCP in 2weeks.   He reports his PCP is also the doctor who has been managing his diabetes.

## 2021-11-27 NOTE — DISCHARGE NOTE PROVIDER - NSDCCPCAREPLAN_GEN_ALL_CORE_FT
PRINCIPAL DISCHARGE DIAGNOSIS  Diagnosis: NSTEMI (non-ST elevation myocardial infarction)  Assessment and Plan of Treatment:       SECONDARY DISCHARGE DIAGNOSES  Diagnosis: HTN (hypertension)  Assessment and Plan of Treatment:     Diagnosis: HLD (hyperlipidemia)  Assessment and Plan of Treatment:     Diagnosis: Diabetes  Assessment and Plan of Treatment:

## 2021-11-27 NOTE — DISCHARGE NOTE PROVIDER - NSDCMRMEDTOKEN_GEN_ALL_CORE_FT
Brilinta (ticagrelor) 90 mg oral tablet: 1 tab(s) orally 2 times a day   metFORMIN 500 mg oral tablet: 1 tab(s) orally 2 times a day  Naprosyn 500 mg oral tablet: 1 tab(s) orally 2 times a day, take with food  Robaxin 500 mg oral tablet: 2 tab(s) orally every 8 hours    aspirin 81 mg oral delayed release tablet: 1 tab(s) orally once a day  atorvastatin 80 mg oral tablet: 1 tab(s) orally once a day  Farxiga 10 mg oral tablet: 1 tab(s) orally once a day  metFORMIN 1000 mg oral tablet: 1 tab(s) orally 2 times a day  ticagrelor 90 mg oral tablet: 1 tab(s) orally every 12 hours  Trulicity Pen 1.5 mg/0.5 mL subcutaneous solution:    aspirin 81 mg oral delayed release tablet: 1 tab(s) orally once a day  atorvastatin 80 mg oral tablet: 1 tab(s) orally once a day  Farxiga 10 mg oral tablet: 1 tab(s) orally once a day  losartan 25 mg oral tablet: 1 tab(s) orally once a day  metFORMIN 1000 mg oral tablet: 1 tab(s) orally 2 times a day  ticagrelor 90 mg oral tablet: 1 tab(s) orally every 12 hours  Trulicity Pen 1.5 mg/0.5 mL subcutaneous solution:    aspirin 81 mg oral delayed release tablet: 1 tab(s) orally once a day  atorvastatin 80 mg oral tablet: 1 tab(s) orally once a day  clopidogrel 75 mg oral tablet: 1 tab(s) orally once a day  Farxiga 10 mg oral tablet: 1 tab(s) orally once a day  losartan 25 mg oral tablet: 1 tab(s) orally once a day  metFORMIN 1000 mg oral tablet: 1 tab(s) orally 2 times a day  Trulicity Pen 1.5 mg/0.5 mL subcutaneous solution:

## 2021-11-27 NOTE — PROGRESS NOTE ADULT - SUBJECTIVE AND OBJECTIVE BOX
Cardiovascular Disease Progress Note    Overnight events: No acute events overnight. Mr. De La Cruz says the chest pain is improved.     Otherwise review of systems negative    Objective Findings:  T(C): 36.8 (11-27-21 @ 04:00), Max: 37.2 (11-26-21 @ 19:03)  HR: 62 (11-27-21 @ 08:00) (59 - 89)  BP: 115/90 (11-27-21 @ 08:00) (112/73 - 170/73)  RR: 18 (11-27-21 @ 08:00) (10 - 23)  SpO2: 100% (11-27-21 @ 08:00) (95% - 100%)  Wt(kg): --  Daily Height in cm: 187.96 (26 Nov 2021 19:03)    Daily       Physical Exam:  Gen: NAD; Patient resting comfortably  HEENT: EOMI, Normocephalic/ atraumatic  CV: RRR, normal S1 + S2, no m/r/g  Lungs:  Normal respiratory effort; clear to auscultation bilaterally  Abd: soft, non-tender; bowel sounds present  Ext: No edema; warm and well perfused    Telemetry: Sinus    Laboratory Data:                        15.3   7.27  )-----------( 174      ( 27 Nov 2021 05:01 )             47.4     11-27    135  |  100  |  12  ----------------------------<  202<H>  3.9   |  27  |  0.67    Ca    9.2      27 Nov 2021 05:01  Phos  3.1     11-27  Mg     1.90     11-27    TPro  6.6  /  Alb  4.0  /  TBili  1.1  /  DBili  x   /  AST  49<H>  /  ALT  22  /  AlkPhos  45  11-27    PT/INR - ( 26 Nov 2021 09:36 )   PT: 11.6 sec;   INR: 1.02 ratio         PTT - ( 26 Nov 2021 09:36 )  PTT:34.6 sec  CARDIAC MARKERS ( 27 Nov 2021 05:01 )  x     / x     / x     / x     / 29.5 ng/mL  CARDIAC MARKERS ( 26 Nov 2021 07:16 )  x     / x     / x     / x     / 19.8 ng/mL          Inpatient Medications:  MEDICATIONS  (STANDING):  aspirin enteric coated 81 milliGRAM(s) Oral daily  atorvastatin 40 milliGRAM(s) Oral at bedtime  glucagon  Injectable 1 milliGRAM(s) IntraMuscular once  heparin   Injectable 5000 Unit(s) SubCutaneous every 12 hours  influenza   Vaccine 0.5 milliLiter(s) IntraMuscular once  insulin lispro (ADMELOG) corrective regimen sliding scale   SubCutaneous three times a day before meals  losartan 25 milliGRAM(s) Oral daily  ticagrelor 90 milliGRAM(s) Oral every 12 hours      Assessment: 56 year old man with HTN and NIDDM presents with chest pain and NSTEMI.    #NSTEMI-  S/P AMIE x 1 100% occluded ostial diagonal artery.  The 30% mLAD and 60% LCx will be medically managed.  Continue DAPT.  Awaiting echo    #HTN-  BP controlled on current therapy.     #NIDDM-  HbA1c 8.2% on Metformin 1000 mg BID and glipizide at home.  Resume metformin 48 hours post cath.    Discharge planning pending echo.       Over 25 minutes spent on total encounter; more than 50% of the visit was spent counseling and/or coordinating care by the attending physician.      Darius Krueger MD [facility]  Cardiovascular Disease  [phone] Cardiovascular Disease Progress Note    Overnight events: No acute events overnight. Mr. De La Cruz says the chest pain is improved.     Otherwise review of systems negative    Objective Findings:  T(C): 36.8 (11-27-21 @ 04:00), Max: 37.2 (11-26-21 @ 19:03)  HR: 62 (11-27-21 @ 08:00) (59 - 89)  BP: 115/90 (11-27-21 @ 08:00) (112/73 - 170/73)  RR: 18 (11-27-21 @ 08:00) (10 - 23)  SpO2: 100% (11-27-21 @ 08:00) (95% - 100%)  Wt(kg): --  Daily Height in cm: 187.96 (26 Nov 2021 19:03)    Daily       Physical Exam:  Gen: NAD; Patient resting comfortably  HEENT: EOMI, Normocephalic/ atraumatic  CV: RRR, normal S1 + S2, no m/r/g  Lungs:  Normal respiratory effort; clear to auscultation bilaterally  Abd: soft, non-tender; bowel sounds present  Ext: No edema; warm and well perfused    Telemetry: Sinus    Laboratory Data:                        15.3   7.27  )-----------( 174      ( 27 Nov 2021 05:01 )             47.4     11-27    135  |  100  |  12  ----------------------------<  202<H>  3.9   |  27  |  0.67    Ca    9.2      27 Nov 2021 05:01  Phos  3.1     11-27  Mg     1.90     11-27    TPro  6.6  /  Alb  4.0  /  TBili  1.1  /  DBili  x   /  AST  49<H>  /  ALT  22  /  AlkPhos  45  11-27    PT/INR - ( 26 Nov 2021 09:36 )   PT: 11.6 sec;   INR: 1.02 ratio         PTT - ( 26 Nov 2021 09:36 )  PTT:34.6 sec  CARDIAC MARKERS ( 27 Nov 2021 05:01 )  x     / x     / x     / x     / 29.5 ng/mL  CARDIAC MARKERS ( 26 Nov 2021 07:16 )  x     / x     / x     / x     / 19.8 ng/mL          Inpatient Medications:  MEDICATIONS  (STANDING):  aspirin enteric coated 81 milliGRAM(s) Oral daily  atorvastatin 40 milliGRAM(s) Oral at bedtime  glucagon  Injectable 1 milliGRAM(s) IntraMuscular once  heparin   Injectable 5000 Unit(s) SubCutaneous every 12 hours  influenza   Vaccine 0.5 milliLiter(s) IntraMuscular once  insulin lispro (ADMELOG) corrective regimen sliding scale   SubCutaneous three times a day before meals  losartan 25 milliGRAM(s) Oral daily  ticagrelor 90 milliGRAM(s) Oral every 12 hours      Assessment: 56 year old man with HTN and NIDDM presents with chest pain and NSTEMI.    #NSTEMI-  S/P AMIE x 1 100% occluded ostial diagonal artery.  The 30% mLAD and 60% LCx will be medically managed.  Continue DAPT.  Awaiting echo    #HTN-  BP controlled on current therapy.     #NIDDM-  HbA1c 8.2% on Metformin 1000 mg BID and glipizide at home.  Resume metformin 48 hours post cath.    Discharge planning pending echo.   Mr. De La Cruz is instructed to f/u with me within a week.       Over 25 minutes spent on total encounter; more than 50% of the visit was spent counseling and/or coordinating care by the attending physician.      Darius Krueger MD [facility]  Cardiovascular Disease  [phone]

## 2021-11-27 NOTE — PROGRESS NOTE ADULT - ASSESSMENT
56 year old man with HTN and NIDDM presents with chest pain and NSTEMI. Found to have 100% occlusion of diagonal. Admitted to CCU for further management.  #CV  #NSTEMI-  - s/p LHC with stent to ostial diagonal   -continue DAPT, lipitor 80  -change lopressor 12.5 bid to toprol xl 25  -initiate losartan 25 qd today  -ECHO today  -possible d/c home later today with follow up    HTN:  -BP is stable   will resume ARB and BB    HLD  -lipid panel reviewed    #Pulm  -on RA, O2 sat adequate  -CXR with clear lungs on 11/26    #Endo  #NIDDM-  -HGBA1c is 8.2  -holding home Metformin 1000 mg BID and glipizide  -SSI  
Mr. De La Cruz is a very pleasant 56M with a PMhx of Hypertension, non-insulin dependent diabetes mellitus Type II who presented to the ER with sudden onset chest pain, left sided that woke him up at 1AM.    NSTEMI   - troponin elevation  - no ST changes appreciated   - s/p cardiac cath with with PCI of ostial diagonal occlusion on 11/26/2021   - cardiology following   - dual antiplatelet therapy, ASA 81 mg PO Qdaily, Brilinta 90 mg po BID  - continue Lipitor 40 mg po qdaily; AM Lipid panel pending   - ECHO pending this AM   - will follow up closely with Dr Krueger outpatient     Diabetes Mellitus Type II   - home regimen is Metformin and Glipizide; HOLD both while in hospital   - also noted to be on Farxiga and Trulicity at home   - ISS ACHS; monitor for hypo/hyperglycemia   - HbA1c 8.2%  - patient to avoid high glycemic index foods     Hypertension   - elevated pressures initially   -  improved BP control this AM   - continue with Losartan 25 mg po qdaily;  at home also reported to be taking Amlodipine/Benzapril combo   - monitor for hypo/hypertension   - DASH diet     DVT PPX Heparin     Discussed with patient, all questions answered, agreeable with plan.     Thank you for allowing me to take part in the care for this patient.

## 2021-11-27 NOTE — DISCHARGE NOTE PROVIDER - CARE PROVIDER_API CALL
Wagner Mixon (MD)  Cardiovascular Disease; Internal Medicine; Interventional Cardiology  [address] [place] [address], [address]  [place] [address]  Phone: [phone]  Fax: [phone]  Follow Up Time: 2 weeks    Araceli Stevenson  Phone: [phone]  Fax: (   )    -  Follow Up Time: 2 weeks   Araceli [place]  Phone: [phone]  Fax: (   )    -  Follow Up Time: 2 weeks    Darius Krueger)  Internal Medicine  [address] [place] [address]  Phone: [phone]  Fax: [phone]  Follow Up Time: 1 week

## 2021-11-27 NOTE — DISCHARGE NOTE NURSING/CASE MANAGEMENT/SOCIAL WORK - PATIENT PORTAL LINK FT
You can access the FollowMyHealth Patient Portal offered by [facility] by registering at the following website: [email or web address]. By joining [facility]’s FollowMyHealth portal, you will also be able to view your health information using other applications (apps) compatible with our system.

## 2021-11-27 NOTE — DISCHARGE NOTE PROVIDER - PROVIDER TOKENS
PROVIDER:[TOKEN:[[id number]],FOLLOWUP:[2 weeks]],FREE:[LAST:[Elva],FIRST:[Araceli],PHONE:[phone]],FAX:[(   )    -],FOLLOWUP:[2 weeks]] FREE:[LAST:[Elva],FIRST:[Araceli],PHONE:[phone]],FAX:[(   )    -],FOLLOWUP:[2 weeks]],PROVIDER:[TOKEN:[[id number]],FOLLOWUP:[1 week]]

## 2021-11-27 NOTE — DISCHARGE NOTE PROVIDER - CARE PROVIDERS DIRECT ADDRESSES
,[email or web address],DirectAddress_Unknown ,DirectAddress_Unknown,DirectAddress_Unknown

## 2022-07-20 ENCOUNTER — INPATIENT (INPATIENT)
Facility: HOSPITAL | Age: 57
LOS: 2 days | Discharge: HOME | End: 2022-07-23
Attending: INTERNAL MEDICINE | Admitting: INTERNAL MEDICINE

## 2022-07-20 VITALS
HEART RATE: 75 BPM | HEIGHT: 74 IN | TEMPERATURE: 98 F | RESPIRATION RATE: 18 BRPM | SYSTOLIC BLOOD PRESSURE: 172 MMHG | DIASTOLIC BLOOD PRESSURE: 86 MMHG | OXYGEN SATURATION: 97 %

## 2022-07-20 PROCEDURE — 93010 ELECTROCARDIOGRAM REPORT: CPT | Mod: NC

## 2022-07-20 PROCEDURE — 99285 EMERGENCY DEPT VISIT HI MDM: CPT | Mod: 25

## 2022-07-20 RX ORDER — ASPIRIN/CALCIUM CARB/MAGNESIUM 324 MG
243 TABLET ORAL ONCE
Refills: 0 | Status: COMPLETED | OUTPATIENT
Start: 2022-07-20 | End: 2022-07-20

## 2022-07-20 NOTE — ED PROVIDER NOTE - CLINICAL SUMMARY MEDICAL DECISION MAKING FREE TEXT BOX
Lizet DO PGY-3: pt w/ hx of NSTEMI p/w 2 weeks of L sided CP that feels like a pressure w/ radiation to the back. Sent in by cardiolgoist (Dr. BRETT Krueger) for admission. Will r/o ACS. TBA. ordered 243mg ASA as pt already took 81mg ASA today

## 2022-07-20 NOTE — ED PROVIDER NOTE - OBJECTIVE STATEMENT
57 y/o M w/ pmhx of NSTEMI in 11/2021 at [facility], DM, HTN p/w 2 weeks of left sided chest pain. Described as pressure w/ radiation to the back. Was intermittent but since yesterday it was constant. States it feels like his prior NSTEMI. Pt denies n/v, sob, abd pain. Pt saw his cardiologist who wants the pt admitted. Pt takes daily 81mg ASA and plavix

## 2022-07-20 NOTE — ED PROVIDER NOTE - ATTENDING CONTRIBUTION TO CARE
57 yo M With PMH CAD (NSTEMI in 11/21) HTN DM presenting with weeks of intermittent CP that is a pressure in the L chest that radiates to the back.  Yesterday pain became constant.  No associated SOB.  Pt spoke with his cardiologist who directed him to the hospital.    Vitals: I have reviewed the patients vital signs  General: nontoxic appearing  HEENT: Atraumatic, normocephalic, airway patent  Eyes: EOMI, tracking appropriately  Neck: no tracheal deviation  Chest/Lungs: no trauma, symmetric chest rise, speaking in complete sentences,  no resp distress  Heart: skin and extremities well perfused, regular rate and rhythm  Neuro: A+Ox3, ambulating without difficulty, appears non focal  MSK: strength at baseline in all extremities, no muscle wasting or atrophy  Skin: no cyanosis, no jaundice     57 yo with CP in the setting of a number of risk factors.  Need to be concerned for an ACS.  Much less consistent with a PE or PTx and not likely infectious.  Will get trops.  EKG without signs of acute ischemia.  Will need admission for formal rule out.    55 y/o M w/ pmhx of NSTEMI in 11/2021 at [facility], DM, HTN p/w 2 weeks of left sided chest pain. Described as pressure w/ radiation to the back. Was intermittent but since yesterday it was constant. States it feels like his prior NSTEMI. Pt denies n/v, sob, abd pain. Pt saw his cardiologist who wants the pt admitted. Pt takes daily 81mg ASA and plavix

## 2022-07-20 NOTE — ED ADULT TRIAGE NOTE - CHIEF COMPLAINT QUOTE
C/o intermittent chest pain, worse on exertion, 2x wks. Denies dizziness or SOB. PMH DM2 , HTN, CAD 1x stent

## 2022-07-20 NOTE — ED ADULT NURSE NOTE - OBJECTIVE STATEMENT
C/o intermittent chest pain, worse on exertion, 2x wks. Denies dizziness or SOB. PMH DM2 , HTN, CAD 1x stent.  PT A&OX4.  No distress noted. Resp WDL.  Cardiac monitor in place.  Skin intact.  PT ambulatory, able to move all extremities.  Will continue to monitor.

## 2022-07-20 NOTE — ED ADULT NURSE NOTE - PAIN: BODY LOCATION
Danie Stephens is a 15 y.o. with history of asthma, T1D.  CC:  Here for follow up asthma.  This history is obtained from the patient, mother.  Records reviewed:  Previous patient of BETSEY Drew    Patient Active Problem List   Diagnosis   • Type 1 diabetes mellitus without complication (HCC)   • Candidal balanitis   • Long-term insulin use (HCC)   • Itching       Asthma HPI:  Any significant flare-ups since last visit: No  Symptoms include:  Cough: minimal cough with URI 1 month ago   Wheezing: no  Did have some mild chest tightness with smoke in the air  Problems with exercise induced coughing, wheezing, or shortness of breath?  No, less active in summer, likes to ski  Has sleep been disturbed due to symptoms: No  How often have you had to use your albuterol for relief of symptoms?  None recently  Symbicort 1 puff qAM, has been on it for several years. When off for 3 weeks possibly in 2020, had chest tightness.  Ran out of montelukast 4 weeks ago.      Current Outpatient Medications:   •  SYMBICORT 160-4.5 MCG/ACT Aerosol, Inhale 2 Puffs 2 Times a Day., Disp: 30.6 g, Rfl: 2  •  albuterol 108 (90 Base) MCG/ACT Aero Soln inhalation aerosol, Inhale 2 Puffs by mouth every 6 hours as needed for Shortness of Breath., Disp: 8.5 g, Rfl: 3  •  Glucagon, rDNA, (GLUCAGON EMERGENCY) 1 MG Kit, And 1 mg IM as needed severe hypoglycemia, Disp: 1 Kit, Rfl: 1  •  insulin lispro (HUMALOG) 100 UNIT/ML, INJECT UP  UNITS PER DAY VIA INSULIN PUMP, Disp: 30 mL, Rfl: 3  •  Insulin Disposable Pump (OMNIPOD DASH 5 PACK PODS) Misc, CHANGE EVERY 2-3 DAYS, Disp: 45 Each, Rfl: 3  •  montelukast (SINGULAIR) 5 MG Chew Tab, Chew 1 tablet every day. (Patient not taking: Reported on 10/7/2021), Disp: 90 tablet, Rfl: 2  •  Insulin Pen Needle (NOVOFINE) 32G X 6 MM Misc, Used to administer insulin., Disp: 200 Each, Rfl: 6  •  Ketone Blood Test (PRECISION XTRA) Strip, TEST PRN BS >300 UP TO 12 X PER DAY, Disp: 10 Strip, Rfl: 6  •  KETOSTIX  "strip, Check as needed blood sugars greater than 300 up to 12 times per day, Disp: 100 Strip, Rfl: 6  •  ONETOUCH VERIO strip, Test blood sugars up to 10 x per day, Disp: 900 Strip, Rfl: 4  •  Lancets (ONETOUCH DELICA PLUS [id number]) Misc, Inject 1 Device as instructed 6 Times a Day., Disp: 200 Each, Rfl: 1  •  insulin glargine (LANTUS SOLOSTAR) 100 UNIT/ML Solution Pen-injector injection, Inject up to 2-25 units/day, Disp: 15 mL, Rfl: 1  •  Glucagon (BAQSIMI TWO PACK) 3 MG/DOSE Powder, Spray 3 mg in nose as needed., Disp: 2 Each, Rfl: 3  •  accu-chek device (PRECISION XTRA) Device, TEST PRN BS >300 UP TO 12 X PER DAY, Disp: 1 Each, Rfl: 3  •  mometasone (ASMANEX) 220 MCG/INH inhaler, Inhale 1 Puff by mouth 2 times a day. (Patient not taking: Reported on 10/7/2021), Disp: 1 g, Rfl: 3      Allergy/sinus HPI:  History of allergies? Weeds, cats, dogs. Getting allergy shots in [place]  Mild itchy nose  Nasal congestion? No, allergy shots are helping  Meds/interventions: takes allertec daily, nasal spray daily    Review of Systems:  Other: is COVID vaccinated      Environmental/Social history:    Pets: cat, tolerating well  Tobacco exposure: no  / in person school attendance: yes        Physical Examination:  Pulse 71   Resp 20   Ht 1.713 m (5' 7.44\")   Wt 55.7 kg (122 lb 12.7 oz)   SpO2 96%   BMI 18.98 kg/m²   General: alert, no distress  Eye Exam: EOMI, Conjunctiva are pink and non-injected  Nose: normal  Oropharynx: mild erythema  Neck: supple, no adenopathy  Lungs: lungs clear to auscultation, good diaphragmatic excursion  Heart: regular rate & rhythm, no murmurs    PFT's  Single spirometry  FVC: 92  FEV1: 77  FEV1/FVC: 72%  FEF 25-75 53       Interpretation: mild obstruction but similar to previous      IMPRESSION/PLAN:  1. Moderate persistent asthma without complication  Will continue symbicort 1 puff BID  Will CAUTIOUSLY stay off montelukast  Lung function findings discussed.    - Spirometry; " Future  - Spirometry    2. Exercise counseling  Discussed safe exercising      Follow up in 3 months for repeat PFT and niox.  Ria Mcfadden     chest, general

## 2022-07-21 DIAGNOSIS — Z29.9 ENCOUNTER FOR PROPHYLACTIC MEASURES, UNSPECIFIED: ICD-10-CM

## 2022-07-21 DIAGNOSIS — R07.9 CHEST PAIN, UNSPECIFIED: ICD-10-CM

## 2022-07-21 DIAGNOSIS — Z98.890 OTHER SPECIFIED POSTPROCEDURAL STATES: Chronic | ICD-10-CM

## 2022-07-21 DIAGNOSIS — I25.10 ATHEROSCLEROTIC HEART DISEASE OF NATIVE CORONARY ARTERY WITHOUT ANGINA PECTORIS: ICD-10-CM

## 2022-07-21 DIAGNOSIS — E11.9 TYPE 2 DIABETES MELLITUS WITHOUT COMPLICATIONS: ICD-10-CM

## 2022-07-21 DIAGNOSIS — I10 ESSENTIAL (PRIMARY) HYPERTENSION: ICD-10-CM

## 2022-07-21 LAB
ALBUMIN SERPL ELPH-MCNC: 4.3 G/DL — SIGNIFICANT CHANGE UP (ref 3.3–5)
ALP SERPL-CCNC: 46 U/L — SIGNIFICANT CHANGE UP (ref 40–120)
ALT FLD-CCNC: 24 U/L — SIGNIFICANT CHANGE UP (ref 4–41)
ANION GAP SERPL CALC-SCNC: 10 MMOL/L — SIGNIFICANT CHANGE UP (ref 7–14)
APTT BLD: 35.7 SEC — SIGNIFICANT CHANGE UP (ref 27–36.3)
AST SERPL-CCNC: 20 U/L — SIGNIFICANT CHANGE UP (ref 4–40)
BASOPHILS # BLD AUTO: 0.03 K/UL — SIGNIFICANT CHANGE UP (ref 0–0.2)
BASOPHILS NFR BLD AUTO: 0.5 % — SIGNIFICANT CHANGE UP (ref 0–2)
BILIRUB SERPL-MCNC: 0.5 MG/DL — SIGNIFICANT CHANGE UP (ref 0.2–1.2)
BUN SERPL-MCNC: 19 MG/DL — SIGNIFICANT CHANGE UP (ref 7–23)
CALCIUM SERPL-MCNC: 9.1 MG/DL — SIGNIFICANT CHANGE UP (ref 8.4–10.5)
CHLORIDE SERPL-SCNC: 102 MMOL/L — SIGNIFICANT CHANGE UP (ref 98–107)
CK MB BLD-MCNC: 1.7 % — SIGNIFICANT CHANGE UP (ref 0–2.5)
CK MB CFR SERPL CALC: 3 NG/ML — SIGNIFICANT CHANGE UP
CK SERPL-CCNC: 173 U/L — SIGNIFICANT CHANGE UP (ref 30–200)
CO2 SERPL-SCNC: 26 MMOL/L — SIGNIFICANT CHANGE UP (ref 22–31)
CREAT SERPL-MCNC: 0.75 MG/DL — SIGNIFICANT CHANGE UP (ref 0.5–1.3)
EGFR: 107 ML/MIN/1.73M2 — SIGNIFICANT CHANGE UP
EOSINOPHIL # BLD AUTO: 0.28 K/UL — SIGNIFICANT CHANGE UP (ref 0–0.5)
EOSINOPHIL NFR BLD AUTO: 4.5 % — SIGNIFICANT CHANGE UP (ref 0–6)
FLUAV AG NPH QL: SIGNIFICANT CHANGE UP
FLUBV AG NPH QL: SIGNIFICANT CHANGE UP
GLUCOSE SERPL-MCNC: 131 MG/DL — HIGH (ref 70–99)
HCT VFR BLD CALC: 46.8 % — SIGNIFICANT CHANGE UP (ref 39–50)
HGB BLD-MCNC: 15.2 G/DL — SIGNIFICANT CHANGE UP (ref 13–17)
IANC: 3.39 K/UL — SIGNIFICANT CHANGE UP (ref 1.8–7.4)
IMM GRANULOCYTES NFR BLD AUTO: 0.3 % — SIGNIFICANT CHANGE UP (ref 0–1.5)
INR BLD: 1.05 RATIO — SIGNIFICANT CHANGE UP (ref 0.88–1.16)
LYMPHOCYTES # BLD AUTO: 1.8 K/UL — SIGNIFICANT CHANGE UP (ref 1–3.3)
LYMPHOCYTES # BLD AUTO: 29.1 % — SIGNIFICANT CHANGE UP (ref 13–44)
MAGNESIUM SERPL-MCNC: 1.9 MG/DL — SIGNIFICANT CHANGE UP (ref 1.6–2.6)
MCHC RBC-ENTMCNC: 27 PG — SIGNIFICANT CHANGE UP (ref 27–34)
MCHC RBC-ENTMCNC: 32.5 GM/DL — SIGNIFICANT CHANGE UP (ref 32–36)
MCV RBC AUTO: 83 FL — SIGNIFICANT CHANGE UP (ref 80–100)
MONOCYTES # BLD AUTO: 0.66 K/UL — SIGNIFICANT CHANGE UP (ref 0–0.9)
MONOCYTES NFR BLD AUTO: 10.7 % — SIGNIFICANT CHANGE UP (ref 2–14)
NEUTROPHILS # BLD AUTO: 3.39 K/UL — SIGNIFICANT CHANGE UP (ref 1.8–7.4)
NEUTROPHILS NFR BLD AUTO: 54.9 % — SIGNIFICANT CHANGE UP (ref 43–77)
NRBC # BLD: 0 /100 WBCS — SIGNIFICANT CHANGE UP
NRBC # FLD: 0 K/UL — SIGNIFICANT CHANGE UP
NT-PROBNP SERPL-SCNC: <5 PG/ML — SIGNIFICANT CHANGE UP
PLATELET # BLD AUTO: 184 K/UL — SIGNIFICANT CHANGE UP (ref 150–400)
POTASSIUM SERPL-MCNC: 3.5 MMOL/L — SIGNIFICANT CHANGE UP (ref 3.5–5.3)
POTASSIUM SERPL-SCNC: 3.5 MMOL/L — SIGNIFICANT CHANGE UP (ref 3.5–5.3)
PROT SERPL-MCNC: 7 G/DL — SIGNIFICANT CHANGE UP (ref 6–8.3)
PROTHROM AB SERPL-ACNC: 12.2 SEC — SIGNIFICANT CHANGE UP (ref 10.5–13.4)
RBC # BLD: 5.64 M/UL — SIGNIFICANT CHANGE UP (ref 4.2–5.8)
RBC # FLD: 13 % — SIGNIFICANT CHANGE UP (ref 10.3–14.5)
RSV RNA NPH QL NAA+NON-PROBE: SIGNIFICANT CHANGE UP
SARS-COV-2 RNA SPEC QL NAA+PROBE: SIGNIFICANT CHANGE UP
SODIUM SERPL-SCNC: 138 MMOL/L — SIGNIFICANT CHANGE UP (ref 135–145)
TROPONIN T, HIGH SENSITIVITY RESULT: 8 NG/L — SIGNIFICANT CHANGE UP
TROPONIN T, HIGH SENSITIVITY RESULT: 9 NG/L — SIGNIFICANT CHANGE UP
WBC # BLD: 6.18 K/UL — SIGNIFICANT CHANGE UP (ref 3.8–10.5)
WBC # FLD AUTO: 6.18 K/UL — SIGNIFICANT CHANGE UP (ref 3.8–10.5)

## 2022-07-21 PROCEDURE — 93454 CORONARY ARTERY ANGIO S&I: CPT | Mod: 26,59

## 2022-07-21 PROCEDURE — 92928 PRQ TCAT PLMT NTRAC ST 1 LES: CPT | Mod: LD

## 2022-07-21 PROCEDURE — 71045 X-RAY EXAM CHEST 1 VIEW: CPT | Mod: 26

## 2022-07-21 PROCEDURE — 99223 1ST HOSP IP/OBS HIGH 75: CPT

## 2022-07-21 PROCEDURE — 92978 ENDOLUMINL IVUS OCT C 1ST: CPT | Mod: 26,LD

## 2022-07-21 PROCEDURE — 93571 IV DOP VEL&/PRESS C FLO 1ST: CPT | Mod: 26,LD

## 2022-07-21 PROCEDURE — 93010 ELECTROCARDIOGRAM REPORT: CPT

## 2022-07-21 PROCEDURE — 93306 TTE W/DOPPLER COMPLETE: CPT | Mod: 26

## 2022-07-21 RX ORDER — CARVEDILOL PHOSPHATE 80 MG/1
3.12 CAPSULE, EXTENDED RELEASE ORAL EVERY 12 HOURS
Refills: 0 | Status: DISCONTINUED | OUTPATIENT
Start: 2022-07-21 | End: 2022-07-23

## 2022-07-21 RX ORDER — HEPARIN SODIUM 5000 [USP'U]/ML
5000 INJECTION INTRAVENOUS; SUBCUTANEOUS EVERY 8 HOURS
Refills: 0 | Status: DISCONTINUED | OUTPATIENT
Start: 2022-07-21 | End: 2022-07-23

## 2022-07-21 RX ORDER — KETOROLAC TROMETHAMINE 30 MG/ML
15 SYRINGE (ML) INJECTION ONCE
Refills: 0 | Status: DISCONTINUED | OUTPATIENT
Start: 2022-07-21 | End: 2022-07-21

## 2022-07-21 RX ORDER — LANOLIN ALCOHOL/MO/W.PET/CERES
3 CREAM (GRAM) TOPICAL AT BEDTIME
Refills: 0 | Status: DISCONTINUED | OUTPATIENT
Start: 2022-07-21 | End: 2022-07-23

## 2022-07-21 RX ORDER — ONDANSETRON 8 MG/1
4 TABLET, FILM COATED ORAL EVERY 8 HOURS
Refills: 0 | Status: DISCONTINUED | OUTPATIENT
Start: 2022-07-21 | End: 2022-07-23

## 2022-07-21 RX ORDER — DULAGLUTIDE 4.5 MG/.5ML
0 INJECTION, SOLUTION SUBCUTANEOUS
Qty: 0 | Refills: 0 | DISCHARGE

## 2022-07-21 RX ORDER — SODIUM CHLORIDE 9 MG/ML
1000 INJECTION, SOLUTION INTRAVENOUS
Refills: 0 | Status: DISCONTINUED | OUTPATIENT
Start: 2022-07-21 | End: 2022-07-23

## 2022-07-21 RX ORDER — CLOPIDOGREL BISULFATE 75 MG/1
75 TABLET, FILM COATED ORAL DAILY
Refills: 0 | Status: DISCONTINUED | OUTPATIENT
Start: 2022-07-21 | End: 2022-07-22

## 2022-07-21 RX ORDER — DEXTROSE 50 % IN WATER 50 %
25 SYRINGE (ML) INTRAVENOUS ONCE
Refills: 0 | Status: DISCONTINUED | OUTPATIENT
Start: 2022-07-21 | End: 2022-07-23

## 2022-07-21 RX ORDER — ASPIRIN/CALCIUM CARB/MAGNESIUM 324 MG
325 TABLET ORAL DAILY
Refills: 0 | Status: DISCONTINUED | OUTPATIENT
Start: 2022-07-22 | End: 2022-07-23

## 2022-07-21 RX ORDER — ATORVASTATIN CALCIUM 80 MG/1
80 TABLET, FILM COATED ORAL AT BEDTIME
Refills: 0 | Status: DISCONTINUED | OUTPATIENT
Start: 2022-07-21 | End: 2022-07-23

## 2022-07-21 RX ORDER — INSULIN LISPRO 100/ML
VIAL (ML) SUBCUTANEOUS AT BEDTIME
Refills: 0 | Status: DISCONTINUED | OUTPATIENT
Start: 2022-07-21 | End: 2022-07-23

## 2022-07-21 RX ORDER — DAPAGLIFLOZIN 10 MG/1
1 TABLET, FILM COATED ORAL
Qty: 0 | Refills: 0 | DISCHARGE

## 2022-07-21 RX ORDER — GLUCAGON INJECTION, SOLUTION 0.5 MG/.1ML
1 INJECTION, SOLUTION SUBCUTANEOUS ONCE
Refills: 0 | Status: DISCONTINUED | OUTPATIENT
Start: 2022-07-21 | End: 2022-07-23

## 2022-07-21 RX ORDER — DEXTROSE 50 % IN WATER 50 %
12.5 SYRINGE (ML) INTRAVENOUS ONCE
Refills: 0 | Status: DISCONTINUED | OUTPATIENT
Start: 2022-07-21 | End: 2022-07-23

## 2022-07-21 RX ORDER — DEXTROSE 50 % IN WATER 50 %
15 SYRINGE (ML) INTRAVENOUS ONCE
Refills: 0 | Status: DISCONTINUED | OUTPATIENT
Start: 2022-07-21 | End: 2022-07-23

## 2022-07-21 RX ORDER — ACETAMINOPHEN 500 MG
650 TABLET ORAL EVERY 6 HOURS
Refills: 0 | Status: DISCONTINUED | OUTPATIENT
Start: 2022-07-21 | End: 2022-07-23

## 2022-07-21 RX ORDER — ASPIRIN/CALCIUM CARB/MAGNESIUM 324 MG
81 TABLET ORAL DAILY
Refills: 0 | Status: DISCONTINUED | OUTPATIENT
Start: 2022-07-21 | End: 2022-07-21

## 2022-07-21 RX ORDER — INSULIN LISPRO 100/ML
VIAL (ML) SUBCUTANEOUS
Refills: 0 | Status: DISCONTINUED | OUTPATIENT
Start: 2022-07-21 | End: 2022-07-23

## 2022-07-21 RX ADMIN — ATORVASTATIN CALCIUM 80 MILLIGRAM(S): 80 TABLET, FILM COATED ORAL at 22:16

## 2022-07-21 RX ADMIN — Medication 15 MILLIGRAM(S): at 23:50

## 2022-07-21 RX ADMIN — Medication 243 MILLIGRAM(S): at 00:37

## 2022-07-21 RX ADMIN — HEPARIN SODIUM 5000 UNIT(S): 5000 INJECTION INTRAVENOUS; SUBCUTANEOUS at 05:49

## 2022-07-21 RX ADMIN — CLOPIDOGREL BISULFATE 75 MILLIGRAM(S): 75 TABLET, FILM COATED ORAL at 11:37

## 2022-07-21 RX ADMIN — CARVEDILOL PHOSPHATE 3.12 MILLIGRAM(S): 80 CAPSULE, EXTENDED RELEASE ORAL at 05:51

## 2022-07-21 RX ADMIN — Medication 15 MILLIGRAM(S): at 22:50

## 2022-07-21 RX ADMIN — HEPARIN SODIUM 5000 UNIT(S): 5000 INJECTION INTRAVENOUS; SUBCUTANEOUS at 22:17

## 2022-07-21 RX ADMIN — Medication 81 MILLIGRAM(S): at 11:37

## 2022-07-21 RX ADMIN — CARVEDILOL PHOSPHATE 3.12 MILLIGRAM(S): 80 CAPSULE, EXTENDED RELEASE ORAL at 18:18

## 2022-07-21 NOTE — H&P ADULT - PROBLEM SELECTOR PLAN 2
-c/w aspirin, statin. Will start on low dose BB  -holding ARB given likely plan for cath in AM  -ischemic workup as aboveS

## 2022-07-21 NOTE — H&P ADULT - ASSESSMENT
57 y/o M w/ pmhx of CAD s/p stent 11/21, DM, HTN, HLD p/w 2 weeks of left sided chest pain c/f unstable angina admitted for cardiac w/u

## 2022-07-21 NOTE — H&P ADULT - NSHPPHYSICALEXAM_GEN_ALL_CORE
GENERAL APPEARANCE: Well developed, well nourished, alert and cooperative. NAD.   HEENT:  PERRL, EOMI. External auditory canals normal, hearing grossly intact.  NECK: Neck supple, non-tender without lymphadenopathy, masses or thyromegaly.  CARDIAC: Normal S1 and S2. No S3, S4 or murmurs. Rhythm is regular.  LUNGS: Clear to auscultation and percussion without rales, rhonchi, wheezing or diminished breath sounds.  ABDOMEN: Positive bowel sounds. Soft, nondistended, nontender. No guarding or rebound.   MUSCULOSKELETAL: ROM intact spine and extremities. No joint erythema or tenderness.   EXTREMITIES: No significant deformity or joint abnormality. No edema. Peripheral pulses intact. No varicosities.  NEUROLOGICAL: CN II-XII intact. Strength and sensation symmetric and intact throughout.   SKIN: Skin normal color, texture and turgor with no lesions or eruptions.  PSYCHIATRIC: AOx3.Normal affect and behavior.

## 2022-07-21 NOTE — H&P ADULT - HISTORY OF PRESENT ILLNESS
57 y/o M w/ pmhx of CAD s/p stent 11/21, DM, HTN, HLD p/w 2 weeks of left sided chest pain. Described as sharp/pressure pain that does not radiate. Pain initially brought on by exertion but now occurs at rest. Last episode of chest pain occurred prior to arrival, denies pain at present. States it feels like his prior NSTEMI. Has not followed with a cardiologist since last year. Cath on 11/21 significant for 100% occluded ostial diagonal artery, 30% mLAD and 60% LCx which was medically managed. Pt denies associated cough, n/v, sob, abd pain, fevers or LE edema.

## 2022-07-21 NOTE — CHART NOTE - NSCHARTNOTEFT_GEN_A_CORE
Called by RN to evaluate patient having mild, dull 2/10 chest pain post stent placement. Pt seen and evaluated at bedside reporting mild pain. Physical examination normal. Vitals stable. EKG performed, revealing normal sinus rhythm with no ischemic changes. No interventions necessary at this time. Will continue to monitor. Called by RN to evaluate patient having mild, dull 2/10 chest pain post stent placement. Pt seen and evaluated at bedside reporting mild, left sided chest pain, described as a pressure. No other associated symptoms. Physical examination normal. Vitals stable. EKG performed, revealing normal sinus rhythm with no ischemic changes. No interventions necessary at this time. Will continue to monitor.

## 2022-07-21 NOTE — H&P ADULT - NSHPLABSRESULTS_GEN_ALL_CORE
(07-21 @ 00:01)                      15.2  6.18 )-----------( 184                 46.8    Neutrophils = 3.39 (54.9%)  Lymphocytes = 1.80 (29.1%)  Eosinophils = 0.28 (4.5%)  Basophils = 0.03 (0.5%)  Monocytes = 0.66 (10.7%)  Bands = --%    07-21    138  |  102  |  19  ----------------------------<  131<H>  3.5   |  26  |  0.75    Ca    9.1      21 Jul 2022 00:01  Mg     1.90     07-21    TPro  7.0  /  Alb  4.3  /  TBili  0.5  /  DBili  x   /  AST  20  /  ALT  24  /  AlkPhos  46  07-21    ( 21 Jul 2022 00:01 )   PT: 12.2 sec;   INR: 1.05 ratio;       PTT:35.7 sec      < from: Xray Chest 1 View- PORTABLE-Urgent (07.21.22 @ 00:44) >      INTERPRETATION:  clear lungs    < end of copied text >      Labs and imaging reviewed  EKG: NSR, no acute ST changes. Unchanged when compared to baseline EKG

## 2022-07-21 NOTE — H&P ADULT - PROBLEM SELECTOR PLAN 1
-Pt p/w chest pain on exertion, now occurring at rest c/f unstable angina given history  -EKG no acute ischemic changes. Initial trop 8  -given no active chest pain and no concerning findings on EKG, lower suspicion for ACS at this time  -f/u repeat trops. If significant uptrend, will need to consider treating for ACS and consult cardiology  -TTE ordered to eval for WMA  -repeat EKG for recurrent chest pain  -monitor on tele

## 2022-07-21 NOTE — CHART NOTE - NSCHARTNOTEFT_GEN_A_CORE
SRAVAN RATLIFF  MRN-[id number]    Patient status post [facility] via right radial artery access. Site clean, dry, and intact. No hematoma or active bleeding. No blood noted on gauze. Extremities warm to touch. Pulses present b/l, capillary refill appropriate. Denies any pain, numbness or tingling. Will continue to monitor.    EKG reviewed:      T(C): 36.5 (07-21-22 @ 19:50), Max: 37 (07-20-22 @ 23:52)  HR: 64 (07-21-22 @ 19:50) (58 - 70)  BP: 133/86 (07-21-22 @ 19:50) (133/86 - 158/87)  RR: 18 (07-21-22 @ 19:50) (18 - 18)  SpO2: 98% (07-21-22 @ 19:50) (98% - 100%)    Elian Coello PA-C SRAVAN RATLIFF  MRN-[id number]    Patient status post LHC via right radial artery access. LHC: pLAD 30%. mLAD 60% (iFR 0.83) s/p AMIE x 1. ostial D1 99% s/p AMIE x 1. LCx ok. OM1 50%. Site clean, dry, and intact. No hematoma or active bleeding. No blood noted on gauze. Extremities warm to touch. Pulses present b/l, capillary refill appropriate. Patient admits to having mild, intermittent left sided chest pain. States pain is similar to the same pain he has been having since admission. No new changes to pain. Denies any shortness of breath, nausea, vomiting, fevers, chills, numbness or tingling.    EKG reviewed: NSR @ 60 bpm; no acute ST-T changes     Analgesia given. Will continue to monitor patient for worsening symptoms.     T(C): 36.5 (07-21-22 @ 19:50), Max: 37 (07-20-22 @ 23:52)  HR: 64 (07-21-22 @ 19:50) (58 - 70)  BP: 133/86 (07-21-22 @ 19:50) (133/86 - 158/87)  RR: 18 (07-21-22 @ 19:50) (18 - 18)  SpO2: 98% (07-21-22 @ 19:50) (98% - 100%)    Elian Coello PA-C

## 2022-07-21 NOTE — H&P ADULT - NSHPREVIEWOFSYSTEMS_GEN_ALL_CORE
CONSTITUTIONAL:  No weight loss, fever, chills, weakness or fatigue.  HEENT:  Eyes:  No visual loss, blurred vision, double vision or yellow sclerae. Ears, Nose, Throat:  No hearing loss, sneezing, congestion, runny nose or sore throat.  SKIN:  No rash or itching.  CARDIOVASCULAR:  +chest pain. No palpitations or edema.  RESPIRATORY:  No shortness of breath, cough or sputum.  GASTROINTESTINAL:  No anorexia, nausea, vomiting or diarrhea. No abdominal pain or blood.  GENITOURINARY:  Denies hematuria, dysuria.   NEUROLOGICAL:  No headache, dizziness, syncope, paralysis, ataxia, numbness or tingling in the extremities. No change in bowel or bladder control.  MUSCULOSKELETAL:  No muscle, back pain, joint pain or stiffness.  HEMATOLOGIC:  No anemia, bleeding or bruising.  PSYCHIATRIC:  No history of depression or anxiety.  ENDOCRINOLOGIC:  No reports of sweating, cold or heat intolerance. No polyuria or polydipsia.  ALLERGIES:  No history of asthma, hives, eczema or rhinitis.

## 2022-07-21 NOTE — PATIENT PROFILE ADULT - FALL HARM RISK - HARM RISK INTERVENTIONS

## 2022-07-22 LAB
A1C WITH ESTIMATED AVERAGE GLUCOSE RESULT: 7.6 % — HIGH (ref 4–5.6)
ANION GAP SERPL CALC-SCNC: 12 MMOL/L — SIGNIFICANT CHANGE UP (ref 7–14)
BUN SERPL-MCNC: 17 MG/DL — SIGNIFICANT CHANGE UP (ref 7–23)
CALCIUM SERPL-MCNC: 8.8 MG/DL — SIGNIFICANT CHANGE UP (ref 8.4–10.5)
CHLORIDE SERPL-SCNC: 99 MMOL/L — SIGNIFICANT CHANGE UP (ref 98–107)
CO2 SERPL-SCNC: 23 MMOL/L — SIGNIFICANT CHANGE UP (ref 22–31)
CREAT SERPL-MCNC: 0.71 MG/DL — SIGNIFICANT CHANGE UP (ref 0.5–1.3)
EGFR: 108 ML/MIN/1.73M2 — SIGNIFICANT CHANGE UP
ESTIMATED AVERAGE GLUCOSE: 171 — SIGNIFICANT CHANGE UP
GLUCOSE SERPL-MCNC: 144 MG/DL — HIGH (ref 70–99)
HCT VFR BLD CALC: 46.2 % — SIGNIFICANT CHANGE UP (ref 39–50)
HGB BLD-MCNC: 15.2 G/DL — SIGNIFICANT CHANGE UP (ref 13–17)
MAGNESIUM SERPL-MCNC: 1.8 MG/DL — SIGNIFICANT CHANGE UP (ref 1.6–2.6)
MCHC RBC-ENTMCNC: 26.8 PG — LOW (ref 27–34)
MCHC RBC-ENTMCNC: 32.9 GM/DL — SIGNIFICANT CHANGE UP (ref 32–36)
MCV RBC AUTO: 81.3 FL — SIGNIFICANT CHANGE UP (ref 80–100)
NRBC # BLD: 0 /100 WBCS — SIGNIFICANT CHANGE UP
NRBC # FLD: 0 K/UL — SIGNIFICANT CHANGE UP
PHOSPHATE SERPL-MCNC: 4.3 MG/DL — SIGNIFICANT CHANGE UP (ref 2.5–4.5)
PLATELET # BLD AUTO: 167 K/UL — SIGNIFICANT CHANGE UP (ref 150–400)
POTASSIUM SERPL-MCNC: 3.7 MMOL/L — SIGNIFICANT CHANGE UP (ref 3.5–5.3)
POTASSIUM SERPL-SCNC: 3.7 MMOL/L — SIGNIFICANT CHANGE UP (ref 3.5–5.3)
RBC # BLD: 5.68 M/UL — SIGNIFICANT CHANGE UP (ref 4.2–5.8)
RBC # FLD: 12.9 % — SIGNIFICANT CHANGE UP (ref 10.3–14.5)
SODIUM SERPL-SCNC: 134 MMOL/L — LOW (ref 135–145)
WBC # BLD: 7.45 K/UL — SIGNIFICANT CHANGE UP (ref 3.8–10.5)
WBC # FLD AUTO: 7.45 K/UL — SIGNIFICANT CHANGE UP (ref 3.8–10.5)

## 2022-07-22 RX ORDER — PRASUGREL 5 MG/1
1 TABLET, FILM COATED ORAL
Qty: 1 | Refills: 0
Start: 2022-07-22 | End: 2022-07-22

## 2022-07-22 RX ORDER — TICAGRELOR 90 MG/1
90 TABLET ORAL EVERY 12 HOURS
Refills: 0 | Status: DISCONTINUED | OUTPATIENT
Start: 2022-07-22 | End: 2022-07-23

## 2022-07-22 RX ORDER — TICAGRELOR 90 MG/1
1 TABLET ORAL
Qty: 1 | Refills: 0
Start: 2022-07-22 | End: 2022-07-22

## 2022-07-22 RX ADMIN — Medication 1: at 17:27

## 2022-07-22 RX ADMIN — HEPARIN SODIUM 5000 UNIT(S): 5000 INJECTION INTRAVENOUS; SUBCUTANEOUS at 12:42

## 2022-07-22 RX ADMIN — Medication 325 MILLIGRAM(S): at 12:43

## 2022-07-22 RX ADMIN — CARVEDILOL PHOSPHATE 3.12 MILLIGRAM(S): 80 CAPSULE, EXTENDED RELEASE ORAL at 17:31

## 2022-07-22 RX ADMIN — ATORVASTATIN CALCIUM 80 MILLIGRAM(S): 80 TABLET, FILM COATED ORAL at 21:51

## 2022-07-22 RX ADMIN — HEPARIN SODIUM 5000 UNIT(S): 5000 INJECTION INTRAVENOUS; SUBCUTANEOUS at 21:52

## 2022-07-22 RX ADMIN — CARVEDILOL PHOSPHATE 3.12 MILLIGRAM(S): 80 CAPSULE, EXTENDED RELEASE ORAL at 06:00

## 2022-07-22 RX ADMIN — HEPARIN SODIUM 5000 UNIT(S): 5000 INJECTION INTRAVENOUS; SUBCUTANEOUS at 05:59

## 2022-07-22 RX ADMIN — Medication 1: at 12:44

## 2022-07-22 RX ADMIN — TICAGRELOR 90 MILLIGRAM(S): 90 TABLET ORAL at 17:27

## 2022-07-22 NOTE — PROVIDER CONTACT NOTE (OTHER) - ASSESSMENT
Patient complaining of pain with grimace present. Stated it is similar to the pain that brought him to the hospital
Patient passed dysphagia screening, no signs of cough or trouble swallowing

## 2022-07-22 NOTE — PROVIDER CONTACT NOTE (OTHER) - ACTION/TREATMENT ORDERED:
ACP notified. ACP ordered Ketorolac. RN give Ketorolac stat and reassess pain. RN continue to monitor
ACP notified. RN contine to monitor

## 2022-07-22 NOTE — PROVIDER CONTACT NOTE (OTHER) - BACKGROUND
Patient admitted for chest pain and is S/P cath
Patient admitted for chest pain and is S/P Cath. History of DM and CAD

## 2022-07-22 NOTE — CONSULT NOTE ADULT - SUBJECTIVE AND OBJECTIVE BOX
Patient is a 56y old  Male who presents with a chief complaint of Unstable angina       HPI:  57 y/o M w/ pmhx of CAD s/p stent 11/21, DM, HTN, HLD p/w 2 weeks of left sided chest pain. Described as sharp/pressure pain that does not radiate. Pain initially brought on by exertion but now occurs at rest. Last episode of chest pain occurred prior to arrival, denies pain at present. States it feels like his prior NSTEMI. Has not followed with a cardiologist since last year. Cath on 11/21 significant for 100% occluded ostial diagonal artery, 30% mLAD and 60% LCx which was medically managed. Pt denies associated cough, n/v, sob, abd pain, fevers or LE edema.  S/P Cath with AMIE. Now have mild chest pain.       PAST MEDICAL & SURGICAL HISTORY:  Hypertension      Diabetes      CAD (coronary artery disease)      H/O percutaneous left heart catheterization          Social History: NO smoking etc.     FAMILY HISTORY:  FH: HTN (hypertension) (Mother)        Allergies    No Known Allergies    Intolerances        REVIEW OF SYSTEMS:    CONSTITUTIONAL: No fever, weight loss, or fatigue  EYES: No eye pain, visual disturbances, or discharge  RESPIRATORY: No cough, wheezing, chills or hemoptysis; No shortness of breath  CARDIOVASCULAR: No chest pain, palpitations, dizziness, or leg swelling  GASTROINTESTINAL: No abdominal or epigastric pain. No nausea, vomiting, or hematemesis; No diarrhea or constipation. No melena or hematochezia.  GENITOURINARY: No dysuria, frequency, hematuria, or incontinence  NEUROLOGICAL: No headaches, memory loss, loss of strength, numbness, or tremors      MEDICATIONS  (STANDING):  aspirin enteric coated 325 milliGRAM(s) Oral daily  atorvastatin 80 milliGRAM(s) Oral at bedtime  carvedilol 3.125 milliGRAM(s) Oral every 12 hours  dextrose 5%. 1000 milliLiter(s) (100 mL/Hr) IV Continuous <Continuous>  dextrose 5%. 1000 milliLiter(s) (50 mL/Hr) IV Continuous <Continuous>  dextrose 50% Injectable 25 Gram(s) IV Push once  dextrose 50% Injectable 12.5 Gram(s) IV Push once  dextrose 50% Injectable 25 Gram(s) IV Push once  glucagon  Injectable 1 milliGRAM(s) IntraMuscular once  heparin   Injectable 5000 Unit(s) SubCutaneous every 8 hours  insulin lispro (ADMELOG) corrective regimen sliding scale   SubCutaneous three times a day before meals  insulin lispro (ADMELOG) corrective regimen sliding scale   SubCutaneous at bedtime  ticagrelor 90 milliGRAM(s) Oral every 12 hours    MEDICATIONS  (PRN):  acetaminophen     Tablet .. 650 milliGRAM(s) Oral every 6 hours PRN Temp greater or equal to 38C (100.4F), Mild Pain (1 - 3)  aluminum hydroxide/magnesium hydroxide/simethicone Suspension 30 milliLiter(s) Oral every 4 hours PRN Dyspepsia  dextrose Oral Gel 15 Gram(s) Oral once PRN Blood Glucose LESS THAN 70 milliGRAM(s)/deciliter  melatonin 3 milliGRAM(s) Oral at bedtime PRN Insomnia  ondansetron Injectable 4 milliGRAM(s) IV Push every 8 hours PRN Nausea and/or Vomiting      Vital Signs Last 24 Hrs  T(C): 36.8 (22 Jul 2022 21:09), Max: 36.8 (22 Jul 2022 21:09)  T(F): 98.3 (22 Jul 2022 21:09), Max: 98.3 (22 Jul 2022 21:09)  HR: 64 (22 Jul 2022 21:09) (62 - 69)  BP: 137/82 (22 Jul 2022 21:09) (107/62 - 137/82)  BP(mean): --  RR: 18 (22 Jul 2022 21:09) (17 - 18)  SpO2: 99% (22 Jul 2022 21:09) (98% - 100%)    Parameters below as of 22 Jul 2022 21:09  Patient On (Oxygen Delivery Method): room air        PHYSICAL EXAM:    GENERAL: NAD, well-groomed, well-developed  HEAD:  Atraumatic, Normocephalic  NECK: Supple, No JVD, Normal thyroid  NERVOUS SYSTEM:  Alert & Oriented X3, No new  focal sign .  CHEST/LUNG: Air entry good bilaterally; No rales, rhonchi, wheezing, or rubs  HEART: Regular rate and rhythm; No murmurs, rubs, or gallops  ABDOMEN: Soft, Nontender, Nondistended; Bowel sounds present  EXTREMITIES:  2+ Peripheral Pulses, No clubbing, cyanosis, or edema    LABS:                        15.2   7.45  )-----------( 167      ( 22 Jul 2022 06:35 )             46.2     07-22    134<L>  |  99  |  17  ----------------------------<  144<H>  3.7   |  23  |  0.71    Ca    8.8      22 Jul 2022 06:35  Phos  4.3     07-22  Mg     1.80     07-22    TPro  7.0  /  Alb  4.3  /  TBili  0.5  /  DBili  x   /  AST  20  /  ALT  24  /  AlkPhos  46  07-21    PT/INR - ( 21 Jul 2022 00:01 )   PT: 12.2 sec;   INR: 1.05 ratio         PTT - ( 21 Jul 2022 00:01 )  PTT:35.7 sec        RADIOLOGY & ADDITIONAL STUDIES:    
Cardiovascular Disease Initial Evaluation    CHIEF COMPLAINT: Chest pain    HISTORY OF PRESENT ILLNESS:  This is a 56 year old man with CAD s/p PCI in 11/2021 in the setting of NSTEMI, uncontrolled DM, HTN, and HLD who presented to my office on 7/20/2022 with 2 weeks of left sided chest pain. Described as sharp/pressure pain that does not radiate. Pain initially brought on by exertion but now occurs at rest.   He states it feels like his prior NSTEMI.   Given his risk factors and crescendo symptoms, he was referred to the ED.  Currently, he has no pain at rest.       Allergies  No Known Allergies      MEDICATIONS:  aspirin enteric coated 81 milliGRAM(s) Oral daily  carvedilol 3.125 milliGRAM(s) Oral every 12 hours  clopidogrel Tablet 75 milliGRAM(s) Oral daily  heparin   Injectable 5000 Unit(s) SubCutaneous every 8 hours        acetaminophen     Tablet .. 650 milliGRAM(s) Oral every 6 hours PRN  melatonin 3 milliGRAM(s) Oral at bedtime PRN  ondansetron Injectable 4 milliGRAM(s) IV Push every 8 hours PRN    aluminum hydroxide/magnesium hydroxide/simethicone Suspension 30 milliLiter(s) Oral every 4 hours PRN    atorvastatin 80 milliGRAM(s) Oral at bedtime  dextrose 50% Injectable 25 Gram(s) IV Push once  dextrose 50% Injectable 12.5 Gram(s) IV Push once  dextrose 50% Injectable 25 Gram(s) IV Push once  dextrose Oral Gel 15 Gram(s) Oral once PRN  glucagon  Injectable 1 milliGRAM(s) IntraMuscular once  insulin lispro (ADMELOG) corrective regimen sliding scale   SubCutaneous three times a day before meals  insulin lispro (ADMELOG) corrective regimen sliding scale   SubCutaneous at bedtime    dextrose 5%. 1000 milliLiter(s) IV Continuous <Continuous>  dextrose 5%. 1000 milliLiter(s) IV Continuous <Continuous>      PAST MEDICAL & SURGICAL HISTORY:  Hypertension      Diabetes      CAD (coronary artery disease)      H/O percutaneous left heart catheterization          FAMILY HISTORY:  FH: HTN (hypertension) (Mother)        SOCIAL HISTORY:    The patient is a nonsmoker       REVIEW OF SYSTEMS:  See HPI, otherwise complete 14 point review of systems negative      PHYSICAL EXAM:  T(C): 36.8 (07-21-22 @ 07:00), Max: 37 (07-20-22 @ 23:52)  HR: 61 (07-21-22 @ 07:00) (61 - 75)  BP: 149/89 (07-21-22 @ 07:00) (134/82 - 172/86)  RR: 18 (07-21-22 @ 07:00) (18 - 18)  SpO2: 100% (07-21-22 @ 07:00) (97% - 100%)  Wt(kg): --  I&O's Summary      Appearance: No Acute Distress; resting comfortably  HEENT:  Normal oral mucosa, PERRL, EOMI	  Cardiovascular: Normal S1 S2, No JVD, No murmurs/rubs/gallops  Respiratory: Normal respiratory effort; Lungs clear to auscultation bilaterally  Gastrointestinal:  Soft, Non-tender, + BS	  Skin: No rashes, No ecchymoses, No cyanosis	  Neurologic: Non-focal; no weakness  Extremities: No clubbing, cyanosis or edema  Vascular: Peripheral pulses palpable 2+ bilaterally  Psychiatry: A & O x 3, Mood & affect appropriate    Laboratory Data:	 	    CBC Full  -  ( 21 Jul 2022 00:01 )  WBC Count : 6.18 K/uL  Hemoglobin : 15.2 g/dL  Hematocrit : 46.8 %  Platelet Count - Automated : 184 K/uL  Mean Cell Volume : 83.0 fL  Mean Cell Hemoglobin : 27.0 pg  Mean Cell Hemoglobin Concentration : 32.5 gm/dL  Auto Neutrophil # : 3.39 K/uL  Auto Lymphocyte # : 1.80 K/uL  Auto Monocyte # : 0.66 K/uL  Auto Eosinophil # : 0.28 K/uL  Auto Basophil # : 0.03 K/uL  Auto Neutrophil % : 54.9 %  Auto Lymphocyte % : 29.1 %  Auto Monocyte % : 10.7 %  Auto Eosinophil % : 4.5 %  Auto Basophil % : 0.5 %    07-21    138  |  102  |  19  ----------------------------<  131<H>  3.5   |  26  |  0.75    Ca    9.1      21 Jul 2022 00:01  Mg     1.90     07-21    TPro  7.0  /  Alb  4.3  /  TBili  0.5  /  DBili  x   /  AST  20  /  ALT  24  /  AlkPhos  46  07-21      proBNP: Serum Pro-Brain Natriuretic Peptide: <5 pg/mL (07-21 @ 00:01)        Interpretation of Telemetry: Sinus    ECG:  	Sinus bradycardia    Assessment:  56 year old man with CAD s/p PCI in 11/2021, HTN and uncontrolled NIDDM presents with unstable angina.    #Unstable angina-  Mr. De La Cruz describes worsening exertional chest pain for the past two weeks.   Of note, his diabetes is poorly controlled.   He is S/P AMIE x 1 100% occluded ostial diagonal artery in 11/2021 in the setting of NSTEMI.  He also had 30% mLAD and 60% LCx.  Concern for progression of CAD.  Plan for cardiac cath today.   Continue DAPT.      #HTN-  BP acceptable on current therapy.     #NIDDM-  Uncontrolled.  Sliding scale insulin.     #ACP (advance care planning)-  Advanced care planning was discussed with the patient.    Cardiac findings were discussed in detail and all questions were answered.   He is agreeable to cath today.     72 minutes spent on total encounter; more than 50% of the visit was spent counseling and/or coordinating care by the attending physician.   	  Darius Krueger MD [facility]  Cardiovascular Diseases  [phone]

## 2022-07-22 NOTE — CONSULT NOTE ADULT - ASSESSMENT
55 y/o M w/ pmhx of CAD s/p stent 11/21, DM, HTN, HLD p/w 2 weeks of left sided chest pain c/f unstable angina admitted for cardiac w/u     Problem/Plan - 1:  ·  Problem: Chest pain.   ·  Plan: -Now mild CP   -S/P cath with AMIE.      Problem/Plan - 2:  ·  Problem: CAD (coronary artery disease).   ·  Plan: -c/w aspirin, statin. Will start on low dose BB  -S/P cath with AMIE.   < from: Transthoracic Echocardiogram (07.21.22 @ 10:16) >  CONCLUSIONS:  1. Normal mitral valve. Minimal mitral regurgitation.  2. Normal left ventricular systolic function. No segmental  wall motion abnormalities.  Estimated LVEF in the 60% range  (by modified Cali's method).  3. Normal right ventricular size and function.  *** Compared with echocardiogram of 11/27/2021, no  significant changes noted.    < end of copied text >       Problem/Plan - 3:  ·  Problem: Diabetes.   ·  Plan: - Sugars fine.   hold oral hypoglycemics   -low ISS  -HgbA1C 7.9      Problem/Plan - 4:  ·  Problem: Hypertension.   ·  Plan: -BP readings fine.   -start coreg given uncontrolled BP.     Problem/Plan - 5:  ·  Problem: Need for prophylactic measure.   ·  Plan: DVT ppx: lovenox.

## 2022-07-22 NOTE — PROGRESS NOTE ADULT - ASSESSMENT
56 year old man with CAD s/p PCI in 11/2021, HTN and uncontrolled NIDDM presents with unstable angina, now s/p LHC w/ PCI with 2x AMIE to D1 and mLAD on 7/21/22. Post-cath check, pt symptoms improved, R radial access site c/d/i no hematoma, bleeding, 2+ pulse.    Recs:  - continue aspirin 325mg daily for 1 month, then transition to 81mg daily  - continue plavix 75mg daily  - continue atorvastatin 80mg daily    Discussed with cath attending.

## 2022-07-23 VITALS
OXYGEN SATURATION: 100 % | TEMPERATURE: 98 F | SYSTOLIC BLOOD PRESSURE: 115 MMHG | DIASTOLIC BLOOD PRESSURE: 75 MMHG | RESPIRATION RATE: 17 BRPM | HEART RATE: 78 BPM

## 2022-07-23 LAB
ANION GAP SERPL CALC-SCNC: 11 MMOL/L — SIGNIFICANT CHANGE UP (ref 7–14)
BUN SERPL-MCNC: 17 MG/DL — SIGNIFICANT CHANGE UP (ref 7–23)
CALCIUM SERPL-MCNC: 8.9 MG/DL — SIGNIFICANT CHANGE UP (ref 8.4–10.5)
CHLORIDE SERPL-SCNC: 101 MMOL/L — SIGNIFICANT CHANGE UP (ref 98–107)
CO2 SERPL-SCNC: 23 MMOL/L — SIGNIFICANT CHANGE UP (ref 22–31)
CREAT SERPL-MCNC: 0.65 MG/DL — SIGNIFICANT CHANGE UP (ref 0.5–1.3)
EGFR: 111 ML/MIN/1.73M2 — SIGNIFICANT CHANGE UP
GLUCOSE SERPL-MCNC: 169 MG/DL — HIGH (ref 70–99)
HCT VFR BLD CALC: 46 % — SIGNIFICANT CHANGE UP (ref 39–50)
HGB BLD-MCNC: 15 G/DL — SIGNIFICANT CHANGE UP (ref 13–17)
MAGNESIUM SERPL-MCNC: 2 MG/DL — SIGNIFICANT CHANGE UP (ref 1.6–2.6)
MCHC RBC-ENTMCNC: 26.4 PG — LOW (ref 27–34)
MCHC RBC-ENTMCNC: 32.6 GM/DL — SIGNIFICANT CHANGE UP (ref 32–36)
MCV RBC AUTO: 80.8 FL — SIGNIFICANT CHANGE UP (ref 80–100)
NRBC # BLD: 0 /100 WBCS — SIGNIFICANT CHANGE UP
NRBC # FLD: 0 K/UL — SIGNIFICANT CHANGE UP
PHOSPHATE SERPL-MCNC: 3.1 MG/DL — SIGNIFICANT CHANGE UP (ref 2.5–4.5)
PLATELET # BLD AUTO: 191 K/UL — SIGNIFICANT CHANGE UP (ref 150–400)
POTASSIUM SERPL-MCNC: 3.7 MMOL/L — SIGNIFICANT CHANGE UP (ref 3.5–5.3)
POTASSIUM SERPL-SCNC: 3.7 MMOL/L — SIGNIFICANT CHANGE UP (ref 3.5–5.3)
RBC # BLD: 5.69 M/UL — SIGNIFICANT CHANGE UP (ref 4.2–5.8)
RBC # FLD: 13 % — SIGNIFICANT CHANGE UP (ref 10.3–14.5)
SODIUM SERPL-SCNC: 135 MMOL/L — SIGNIFICANT CHANGE UP (ref 135–145)
WBC # BLD: 5.11 K/UL — SIGNIFICANT CHANGE UP (ref 3.8–10.5)
WBC # FLD AUTO: 5.11 K/UL — SIGNIFICANT CHANGE UP (ref 3.8–10.5)

## 2022-07-23 RX ORDER — ASPIRIN/CALCIUM CARB/MAGNESIUM 324 MG
1 TABLET ORAL
Qty: 28 | Refills: 0
Start: 2022-07-23 | End: 2022-08-19

## 2022-07-23 RX ORDER — TICAGRELOR 90 MG/1
1 TABLET ORAL
Qty: 60 | Refills: 1
Start: 2022-07-23 | End: 2022-09-20

## 2022-07-23 RX ORDER — LOSARTAN POTASSIUM 100 MG/1
1 TABLET, FILM COATED ORAL
Qty: 30 | Refills: 0
Start: 2022-07-23 | End: 2022-08-21

## 2022-07-23 RX ADMIN — Medication 325 MILLIGRAM(S): at 12:49

## 2022-07-23 RX ADMIN — CARVEDILOL PHOSPHATE 3.12 MILLIGRAM(S): 80 CAPSULE, EXTENDED RELEASE ORAL at 17:17

## 2022-07-23 RX ADMIN — HEPARIN SODIUM 5000 UNIT(S): 5000 INJECTION INTRAVENOUS; SUBCUTANEOUS at 05:55

## 2022-07-23 RX ADMIN — Medication 2: at 08:47

## 2022-07-23 RX ADMIN — Medication 1: at 17:27

## 2022-07-23 RX ADMIN — TICAGRELOR 90 MILLIGRAM(S): 90 TABLET ORAL at 17:16

## 2022-07-23 RX ADMIN — TICAGRELOR 90 MILLIGRAM(S): 90 TABLET ORAL at 05:55

## 2022-07-23 RX ADMIN — HEPARIN SODIUM 5000 UNIT(S): 5000 INJECTION INTRAVENOUS; SUBCUTANEOUS at 12:49

## 2022-07-23 RX ADMIN — CARVEDILOL PHOSPHATE 3.12 MILLIGRAM(S): 80 CAPSULE, EXTENDED RELEASE ORAL at 05:55

## 2022-07-23 RX ADMIN — Medication 1: at 12:50

## 2022-07-23 NOTE — DISCHARGE NOTE NURSING/CASE MANAGEMENT/SOCIAL WORK - PATIENT PORTAL LINK FT
You can access the FollowMyHealth Patient Portal offered by [facility] by registering at the following website: [email or web address]. By joining [employer]’s FollowMyHealth portal, you will also be able to view your health information using other applications (apps) compatible with our system.

## 2022-07-23 NOTE — PROGRESS NOTE ADULT - SUBJECTIVE AND OBJECTIVE BOX
Cardiovascular Disease Progress Note    Overnight events: No acute events overnight.   Patient denies chest pain or SOB.    Otherwise review of systems negative    Objective Findings:  T(C): 36.5 (07-23-22 @ 05:09), Max: 36.9 (07-23-22 @ 01:09)  HR: 64 (07-23-22 @ 05:09) (64 - 69)  BP: 128/76 (07-23-22 @ 05:09) (121/85 - 137/82)  RR: 18 (07-23-22 @ 05:09) (17 - 18)  SpO2: 99% (07-23-22 @ 05:09) (98% - 100%)  Wt(kg): --  Daily     Daily       Physical Exam:  Gen: NAD; Patient resting comfortably  HEENT: EOMI, Normocephalic/ atraumatic  CV: RRR, normal S1 + S2, no m/r/g  Lungs:  Normal respiratory effort; clear to auscultation bilaterally  Abd: soft, non-tender; bowel sounds present  Ext: No edema; warm and well perfused    Telemetry: Sinus    Laboratory Data:                        15.0   5.11  )-----------( 191      ( 23 Jul 2022 06:53 )             46.0     07-22    134<L>  |  99  |  17  ----------------------------<  144<H>  3.7   |  23  |  0.71    Ca    8.8      22 Jul 2022 06:35  Phos  4.3     07-22  Mg     1.80     07-22                Inpatient Medications:  MEDICATIONS  (STANDING):  aspirin enteric coated 325 milliGRAM(s) Oral daily  atorvastatin 80 milliGRAM(s) Oral at bedtime  carvedilol 3.125 milliGRAM(s) Oral every 12 hours  dextrose 5%. 1000 milliLiter(s) (100 mL/Hr) IV Continuous <Continuous>  dextrose 5%. 1000 milliLiter(s) (50 mL/Hr) IV Continuous <Continuous>  dextrose 50% Injectable 25 Gram(s) IV Push once  dextrose 50% Injectable 12.5 Gram(s) IV Push once  dextrose 50% Injectable 25 Gram(s) IV Push once  glucagon  Injectable 1 milliGRAM(s) IntraMuscular once  heparin   Injectable 5000 Unit(s) SubCutaneous every 8 hours  insulin lispro (ADMELOG) corrective regimen sliding scale   SubCutaneous three times a day before meals  insulin lispro (ADMELOG) corrective regimen sliding scale   SubCutaneous at bedtime  ticagrelor 90 milliGRAM(s) Oral every 12 hours      Assessment: 56 year old man with CAD s/p PCI in 11/2021, HTN and uncontrolled NIDDM presents with unstable angina.    #Unstable angina-  Status post cardiac cath on 7/21/2022 revealing re-occluded diagonal artery.  S/P AMIE to the ostial diagonal and mLAD arteries.   Echo with preserved LVEF.   Plavix now changed to Brilinta.  Continue  mg for 4 weeks, then we will transition to ASA 81 mg.       #HTN-  BP acceptable on current therapy.     #NIDDM-  Sliding scale insulin.     Discharge planning today.         Over 25 minutes spent on total encounter; more than 50% of the visit was spent counseling and/or coordinating care by the attending physician.      Darius Krueger MD [facility]  Cardiovascular Disease  [phone]
Cardiovascular Disease Progress Note    Overnight events: No acute events overnight.   Patient reports mild discomfort.    Otherwise review of systems negative    Objective Findings:  T(C): 36.6 (22 @ 05:50), Max: 36.7 (22 @ 02:00)  HR: 62 (22 @ 05:50) (61 - 64)  BP: 107/62 (22 @ 05:50) (107/62 - 133/86)  RR: 18 (22 @ 05:50) (18 - 18)  SpO2: 99% (22 @ 05:50) (98% - 99%)  Wt(kg): --  Daily Height in cm: 182.88 (2022 19:50)    Daily Weight in k (2022 19:50)      Physical Exam:  Gen: NAD; Patient resting comfortably  HEENT: EOMI, Normocephalic/ atraumatic  CV: RRR, normal S1 + S2, no m/r/g  Lungs:  Normal respiratory effort; clear to auscultation bilaterally  Abd: soft, non-tender; bowel sounds present  Ext: No edema; warm and well perfused    Telemetry: Sinus; no ectopy    Laboratory Data:                        15.2   7.45  )-----------( 167      ( 2022 06:35 )             46.2     07-    134<L>  |  99  |  17  ----------------------------<  144<H>  3.7   |  23  |  0.71    Ca    8.8      2022 06:35  Phos  4.3     07-  Mg     1.80     -    TPro  7.0  /  Alb  4.3  /  TBili  0.5  /  DBili  x   /  AST  20  /  ALT  24  /  AlkPhos  46  07-21    PT/INR - ( 2022 00:01 )   PT: 12.2 sec;   INR: 1.05 ratio         PTT - ( 2022 00:01 )  PTT:35.7 sec  CARDIAC MARKERS ( 2022 04:36 )  x     / x     / 173 U/L / x     / 3.0 ng/mL          Inpatient Medications:  MEDICATIONS  (STANDING):  aspirin enteric coated 325 milliGRAM(s) Oral daily  atorvastatin 80 milliGRAM(s) Oral at bedtime  carvedilol 3.125 milliGRAM(s) Oral every 12 hours  clopidogrel Tablet 75 milliGRAM(s) Oral daily  dextrose 5%. 1000 milliLiter(s) (100 mL/Hr) IV Continuous <Continuous>  dextrose 5%. 1000 milliLiter(s) (50 mL/Hr) IV Continuous <Continuous>  dextrose 50% Injectable 25 Gram(s) IV Push once  dextrose 50% Injectable 12.5 Gram(s) IV Push once  dextrose 50% Injectable 25 Gram(s) IV Push once  glucagon  Injectable 1 milliGRAM(s) IntraMuscular once  heparin   Injectable 5000 Unit(s) SubCutaneous every 8 hours  insulin lispro (ADMELOG) corrective regimen sliding scale   SubCutaneous three times a day before meals  insulin lispro (ADMELOG) corrective regimen sliding scale   SubCutaneous at bedtime      Assessment: 56 year old man with CAD s/p PCI in 2021, HTN and uncontrolled NIDDM presents with unstable angina.    #Unstable angina-  Status post cardiac cath on 2022 revealing re-occluded diagonal artery.  S/P AMIE to ostial diagonal and mLAD arteries.   Echo with preserved LVEF.   Continue DAPT and high dose statin.   We will change Plavix to either Effient or Brilinta prior depending on insurance coverage.       #HTN-  BP acceptable on current therapy.     #NIDDM-  Uncontrolled.  Sliding scale insulin.     Plan for discharge on .  Discussed with patient and ACPDarshana    Over 25 minutes spent on total encounter; more than 50% of the visit was spent counseling and/or coordinating care by the attending physician.      Darius Krueger MD [facility]  Cardiovascular Disease  [phone]
Cardiology Fellow Post-Cath Follow-up Note    Subjective:  - pt received LHC w/ PCI via R radial access site yesterday, AMIE x2 (to D1 and mLAD)  - this morning pt states no pain or bleeding at R radial access site. He has been ambulatory this morning, states that his chest pain is much improved compared to presentation, denies sob, cough, fever, abd pain, palpitations, light-headed, fainting      Physical Exam:  T(F): 97.9 (07-22), Max: 98 (07-22)  HR: 62 (07-22) (58 - 64)  BP: 107/62 (07-22) (107/62 - 149/89)  BP(mean): --  ABP: --  ABP(mean): --  RR: 18 (07-22)  SpO2: 99% (07-22)  GENERAL: No acute distress, well-developed  HEAD:  Atraumatic, Normocephalic  ENT: EOMI, PERRLA, conjunctiva and sclera clear, Neck supple, No JVD, moist mucosa  CHEST/LUNG: Clear to auscultation bilaterally; No wheeze, equal breath sounds bilaterally   HEART: Regular rate and rhythm; No murmurs, rubs, or gallops  ABDOMEN: Soft, Nontender, Nondistended; Bowel sounds present  EXTREMITIES:  No clubbing, cyanosis, or edema. R radial access site c/d/i, no bleeding, no hematoma, 2+ radial pulse, R hand neurovascularly intact    Cardiovascular diagnostic testings: personally reviewed    Imaging diagnostic testings: personally reviewed    Labs: Personally reviewed                        15.2   7.45  )-----------( 167      ( 22 Jul 2022 06:35 )             46.2     07-22    134<L>  |  99  |  17  ----------------------------<  144<H>  3.7   |  23  |  0.71    Ca    8.8      22 Jul 2022 06:35  Phos  4.3     07-22  Mg     1.80     07-22    TPro  7.0  /  Alb  4.3  /  TBili  0.5  /  DBili  x   /  AST  20  /  ALT  24  /  AlkPhos  46  07-21    PT/INR - ( 21 Jul 2022 00:01 )   PT: 12.2 sec;   INR: 1.05 ratio         PTT - ( 21 Jul 2022 00:01 )  PTT:35.7 sec    CARDIAC MARKERS ( 21 Jul 2022 04:36 )  9 ng/L / x     / x     / 173 U/L / x     / 3.0 ng/mL  CARDIAC MARKERS ( 21 Jul 2022 00:01 )  8 ng/L / x     / x     / x     / x     / x            Serum Pro-Brain Natriuretic Peptide: <5 pg/mL (07-21 @ 00:01)          
Date of Service  : 07-23-22 @ 10:25    INTERVAL HPI/OVERNIGHT EVENTS: I feel fine.   Vital Signs Last 24 Hrs  T(C): 36.5 (23 Jul 2022 05:09), Max: 36.9 (23 Jul 2022 01:09)  T(F): 97.7 (23 Jul 2022 05:09), Max: 98.4 (23 Jul 2022 01:09)  HR: 64 (23 Jul 2022 05:09) (64 - 69)  BP: 128/76 (23 Jul 2022 05:09) (121/85 - 137/82)  BP(mean): --  RR: 18 (23 Jul 2022 05:09) (17 - 18)  SpO2: 99% (23 Jul 2022 05:09) (98% - 100%)    Parameters below as of 23 Jul 2022 05:09  Patient On (Oxygen Delivery Method): room air      I&O's Summary    22 Jul 2022 07:01  -  23 Jul 2022 07:00  --------------------------------------------------------  IN: 0 mL / OUT: 800 mL / NET: -800 mL      MEDICATIONS  (STANDING):  aspirin enteric coated 325 milliGRAM(s) Oral daily  atorvastatin 80 milliGRAM(s) Oral at bedtime  carvedilol 3.125 milliGRAM(s) Oral every 12 hours  dextrose 5%. 1000 milliLiter(s) (100 mL/Hr) IV Continuous <Continuous>  dextrose 5%. 1000 milliLiter(s) (50 mL/Hr) IV Continuous <Continuous>  dextrose 50% Injectable 25 Gram(s) IV Push once  dextrose 50% Injectable 12.5 Gram(s) IV Push once  dextrose 50% Injectable 25 Gram(s) IV Push once  glucagon  Injectable 1 milliGRAM(s) IntraMuscular once  heparin   Injectable 5000 Unit(s) SubCutaneous every 8 hours  insulin lispro (ADMELOG) corrective regimen sliding scale   SubCutaneous three times a day before meals  insulin lispro (ADMELOG) corrective regimen sliding scale   SubCutaneous at bedtime  ticagrelor 90 milliGRAM(s) Oral every 12 hours    MEDICATIONS  (PRN):  acetaminophen     Tablet .. 650 milliGRAM(s) Oral every 6 hours PRN Temp greater or equal to 38C (100.4F), Mild Pain (1 - 3)  aluminum hydroxide/magnesium hydroxide/simethicone Suspension 30 milliLiter(s) Oral every 4 hours PRN Dyspepsia  dextrose Oral Gel 15 Gram(s) Oral once PRN Blood Glucose LESS THAN 70 milliGRAM(s)/deciliter  melatonin 3 milliGRAM(s) Oral at bedtime PRN Insomnia  ondansetron Injectable 4 milliGRAM(s) IV Push every 8 hours PRN Nausea and/or Vomiting    LABS:                        15.0   5.11  )-----------( 191      ( 23 Jul 2022 06:53 )             46.0     07-23    135  |  101  |  17  ----------------------------<  169<H>  3.7   |  23  |  0.65    Ca    8.9      23 Jul 2022 06:53  Phos  3.1     07-23  Mg     2.00     07-23          CAPILLARY BLOOD GLUCOSE      POCT Blood Glucose.: 215 mg/dL (23 Jul 2022 08:19)  POCT Blood Glucose.: 179 mg/dL (22 Jul 2022 21:17)  POCT Blood Glucose.: 171 mg/dL (22 Jul 2022 16:56)  POCT Blood Glucose.: 180 mg/dL (22 Jul 2022 12:12)          REVIEW OF SYSTEMS:  CONSTITUTIONAL: No fever, weight loss, or fatigue  EYES: No eye pain, visual disturbances, or discharge  ENMT:  No difficulty hearing, tinnitus, vertigo; No sinus or throat pain  NECK: No pain or stiffness  RESPIRATORY: No cough, wheezing, chills or hemoptysis; No shortness of breath  CARDIOVASCULAR: No chest pain, palpitations, dizziness, or leg swelling  GASTROINTESTINAL: No abdominal or epigastric pain. No nausea, vomiting, or hematemesis; No diarrhea or constipation. No melena or hematochezia.  GENITOURINARY: No dysuria, frequency, hematuria, or incontinence  NEUROLOGICAL: No headaches, memory loss, loss of strength, numbness, or tremors      Consultant(s) Notes Reviewed:  [x ] YES  [ ] NO    PHYSICAL EXAM:  GENERAL: NAD, well-groomed, well-developed,not in any distress ,  HEAD:  Atraumatic, Normocephalic  EYES: EOMI, PERRLA, conjunctiva and sclera clear  ENMT: No tonsillar erythema, exudates, or enlargement; Moist mucous membranes, Good dentition, No lesions  NECK: Supple, No JVD, Normal thyroid  NERVOUS SYSTEM:  Alert & Oriented X3, No focal deficit   CHEST/LUNG: Good air entry bilateral with no  rales, rhonchi, wheezing, or rubs  HEART: Regular rate and rhythm; No murmurs, rubs, or gallops  ABDOMEN: Soft, Nontender, Nondistended; Bowel sounds present  EXTREMITIES:  2+ Peripheral Pulses, No clubbing, cyanosis, or edema  SKIN: No rashes or lesions    Care Discussed with Consultants/Other Providers [ x] YES  [ ] NO

## 2022-07-23 NOTE — PROGRESS NOTE ADULT - ASSESSMENT
57 y/o M w/ pmhx of CAD s/p stent 11/21, DM, HTN, HLD p/w 2 weeks of left sided chest pain c/f unstable angina admitted for cardiac w/u     Problem/Plan - 1:  ·  Problem: Chest pain.   ·  Plan: -Now mild CP   -S/P cath with AMIE.      Problem/Plan - 2:  ·  Problem: CAD (coronary artery disease).   ·  Plan: -c/w aspirin, statin. Will start on low dose BB  -S/P cath with AMIE.   < from: Transthoracic Echocardiogram (07.21.22 @ 10:16) >  CONCLUSIONS:  1. Normal mitral valve. Minimal mitral regurgitation.  2. Normal left ventricular systolic function. No segmental  wall motion abnormalities.  Estimated LVEF in the 60% range  (by modified Cali's method).  3. Normal right ventricular size and function.  *** Compared with echocardiogram of 11/27/2021, no  significant changes noted.    < end of copied text >       Problem/Plan - 3:  ·  Problem: Diabetes.   ·  Plan: - Sugars fine.   hold oral hypoglycemics   -low ISS  -HgbA1C 7.9      Problem/Plan - 4:  ·  Problem: Hypertension.   ·  Plan: -BP readings fine.   -start coreg given uncontrolled BP.     Problem/Plan - 5:  ·  Problem: Need for prophylactic measure.   ·  Plan: DVT ppx: lovenox.

## 2022-07-23 NOTE — DISCHARGE NOTE PROVIDER - NSDCCPCAREPLAN_GEN_ALL_CORE_FT
PRINCIPAL DISCHARGE DIAGNOSIS  Diagnosis: Chest pain  Assessment and Plan of Treatment: You had experienced chest pain with exertion. You underwent cath procedure and found to have a blockage. Please follow up with your cardiologist and primary care physician within 1 week and continue taking the medications as prescribed.      SECONDARY DISCHARGE DIAGNOSES  Diagnosis: Diabetes  Assessment and Plan of Treatment: Your Hemoglobin A1C is 7.6. Target goal for hemoglobin A1C is <7. Monitor blood glucose levels throughout the day before meals and at bedtime. Record blood sugars and bring to outpatient providers appointment in order to be reviewed by your doctor for management modifications. If your sugars are more than 400 or less than 70 you should contact your PCP immediately. Monitor for signs/symptoms of low blood glucose, such as, dizziness, altered mental status, or cool/clammy skin. In addition, monitor for signs/symptoms of high blood glucose, such as, feeling hot, dry, fatigued, or with increased thirst/urination. Make regular podiatry appointments in order to have feet checked for wounds and uncontrolled toe nail growth to prevent infections, as well as, appointments with an ophthalmologist to monitor your vision.    Diagnosis: Hypertension  Assessment and Plan of Treatment: Low sodium and fat diet, continue anti-hypertensive medications, and follow up with primary care physician.

## 2022-07-23 NOTE — DISCHARGE NOTE PROVIDER - HOSPITAL COURSE
55 y/o M w/ pmhx of CAD s/p stent 11/21, DM, HTN, HLD p/w 2 weeks of left sided chest pain c/f unstable angina.    Unstable angina   - p/w chest pain on exertion, now occurring at rest c/f unstable angina given history  - troponin 8-->9  - Status post cardiac cath on 7/21/2022 revealing re-occluded diagonal artery.  - S/P AMIE to ostial diagonal and mLAD arteries.   - Echo with preserved LVEF.   - Continue DAPT and high dose statin.   - changed Plavix to Brilinta  - Continue  mg for 4 weeks, then we will transition to ASA 81 mg.     HTN  - BP acceptable on current therapy.   - started on coreg given uncontrolled BP    DM   - Sliding scale insulin.   - A1C: 7.6; f/u outpatient     On 07/23/2022, case was discussed with , patient is medically cleared and optimized for discharge today. All medications were reviewed with attending, and sent to mutually agreed upon pharmacy. 55 y/o M w/ pmhx of CAD s/p stent 11/21, DM, HTN, HLD p/w 2 weeks of left sided chest pain c/f unstable angina.    Unstable angina   - p/w chest pain on exertion, now occurring at rest c/f unstable angina given history  - troponin 8-->9  - Status post cardiac cath on 7/21/2022 revealing re-occluded diagonal artery.  - S/P AMIE to ostial diagonal and mLAD arteries.   - Echo with preserved LVEF.   - Continue DAPT and high dose statin.   - changed Plavix to Brilinta  - Continue  mg for 4 weeks, then we will transition to ASA 81 mg.     HTN  - BP acceptable on current therapy.   - started on coreg given uncontrolled BP    DM   - Sliding scale insulin.   - A1C: 7.6; f/u outpatient     On 07/23/2022, case was discussed with  and Dr. Krueger, patient is medically cleared and optimized for discharge today. All medications were reviewed with attending, and sent to mutually agreed upon pharmacy. 55 y/o M w/ pmhx of CAD s/p stent 11/21, DM, HTN, HLD p/w 2 weeks of left sided chest pain c/f unstable angina.    Unstable angina   - p/w chest pain on exertion, now occurring at rest c/f unstable angina given history  - troponin 8-->9  - Status post cardiac cath on 7/21/2022 revealing re-occluded diagonal artery.  - S/P AMIE to ostial diagonal and mLAD arteries.   - Echo with preserved LVEF.   - Continue DAPT and high dose statin.   - changed Plavix to Brilinta  - Continue  mg for 4 weeks, then we will transition to ASA 81 mg.     HTN  - BP acceptable on current therapy.   - started on coreg given uncontrolled BP    DM   - Sliding scale insulin.   - A1C: 7.6; f/u outpatient       On 07/23/2022, case was discussed with  and Dr. Krueger, patient is medically cleared and optimized for discharge today. All medications were reviewed with attending, and sent to mutually agreed upon pharmacy.

## 2022-07-23 NOTE — DISCHARGE NOTE PROVIDER - CARE PROVIDER_API CALL
Darius Krueger)  Internal Medicine  [address] [place] [address]  Phone: [phone]  Fax: [phone]  Follow Up Time: 1 week

## 2022-07-23 NOTE — DISCHARGE NOTE PROVIDER - NSDCMRMEDTOKEN_GEN_ALL_CORE_FT
atorvastatin 80 mg oral tablet: 1 tab(s) orally once a day  Farxiga 10 mg oral tablet: 1 tab(s) orally once a day  metFORMIN 1000 mg oral tablet: 1 tab(s) orally 2 times a day  Trulicity Pen 1.5 mg/0.5 mL subcutaneous solution:    aspirin 325 mg oral delayed release tablet: 1 tab(s) orally once a day  atorvastatin 80 mg oral tablet: 1 tab(s) orally once a day  Farxiga 10 mg oral tablet: 1 tab(s) orally once a day  losartan 25 mg oral tablet: 1 tab(s) orally once a day  metFORMIN 1000 mg oral tablet: 1 tab(s) orally 2 times a day  ticagrelor 90 mg oral tablet: 1 tab(s) orally every 12 hours  Trulicity Pen 1.5 mg/0.5 mL subcutaneous solution:

## 2024-02-15 NOTE — DISCHARGE NOTE PROVIDER - NSRESEARCHGRANT_PROPHYLAXISRECOMFT_GEN_A_CORE
Have You Ever Had A Finger Or Toe That Was Completely Swollen And Painful For No Apparent Reason?: No Positive Screening Text: A score of 3 or greater is considered a positive PEST score. Negative Screening Text: A score of less than 3 is considered a negative PEST score. Detail Level: Simple Has A Doctor Ever Told You That You Have Arthritis?: Yes IMPROVE-DD Application Not Available

## 2024-11-30 NOTE — ED ADULT NURSE NOTE - ALCOHOL PRE SCREEN (AUDIT - C)
Problem: Respiratory  Goal: Minimize anxiety/maximize coping throughout shift  Outcome: Progressing  Goal: Minimal/no exertional discomfort or dyspnea this shift  Outcome: Progressing  Goal: No signs of respiratory distress (eg. Use of accessory muscles. Peds grunting)  Outcome: Progressing  Goal: Verbalize decreased shortness of breath this shift  Outcome: Progressing  Goal: Wean oxygen to maintain O2 saturation per order/standard this shift  Outcome: Progressing      Statement Selected show
